# Patient Record
Sex: FEMALE | Race: OTHER
[De-identification: names, ages, dates, MRNs, and addresses within clinical notes are randomized per-mention and may not be internally consistent; named-entity substitution may affect disease eponyms.]

---

## 2023-01-25 PROBLEM — Z00.00 ENCOUNTER FOR PREVENTIVE HEALTH EXAMINATION: Status: ACTIVE | Noted: 2023-01-25

## 2023-02-02 ENCOUNTER — ASOB RESULT (OUTPATIENT)
Age: 18
End: 2023-02-02

## 2023-02-02 ENCOUNTER — APPOINTMENT (OUTPATIENT)
Dept: ANTEPARTUM | Facility: CLINIC | Age: 18
End: 2023-02-02
Payer: MEDICAID

## 2023-02-02 PROCEDURE — 76811 OB US DETAILED SNGL FETUS: CPT

## 2023-02-09 ENCOUNTER — TRANSCRIPTION ENCOUNTER (OUTPATIENT)
Age: 18
End: 2023-02-09

## 2023-03-07 ENCOUNTER — NON-APPOINTMENT (OUTPATIENT)
Age: 18
End: 2023-03-07

## 2023-03-27 ENCOUNTER — ASOB RESULT (OUTPATIENT)
Age: 18
End: 2023-03-27

## 2023-03-27 ENCOUNTER — APPOINTMENT (OUTPATIENT)
Dept: ANTEPARTUM | Facility: CLINIC | Age: 18
End: 2023-03-27
Payer: MEDICAID

## 2023-03-27 ENCOUNTER — APPOINTMENT (OUTPATIENT)
Dept: MATERNAL FETAL MEDICINE | Facility: CLINIC | Age: 18
End: 2023-03-27
Payer: MEDICAID

## 2023-03-27 DIAGNOSIS — O24.419 GESTATIONAL DIABETES MELLITUS IN PREGNANCY, UNSPECIFIED CONTROL: ICD-10-CM

## 2023-03-27 PROCEDURE — 76816 OB US FOLLOW-UP PER FETUS: CPT

## 2023-03-27 PROCEDURE — 76819 FETAL BIOPHYS PROFIL W/O NST: CPT | Mod: 59

## 2023-03-27 PROCEDURE — G0109 DIAB MANAGE TRN IND/GROUP: CPT | Mod: 95

## 2023-04-05 ENCOUNTER — APPOINTMENT (OUTPATIENT)
Dept: MATERNAL FETAL MEDICINE | Facility: CLINIC | Age: 18
End: 2023-04-05
Payer: MEDICAID

## 2023-04-05 ENCOUNTER — ASOB RESULT (OUTPATIENT)
Age: 18
End: 2023-04-05

## 2023-04-05 PROCEDURE — G0108 DIAB MANAGE TRN  PER INDIV: CPT | Mod: 95

## 2023-04-19 ENCOUNTER — APPOINTMENT (OUTPATIENT)
Dept: MATERNAL FETAL MEDICINE | Facility: CLINIC | Age: 18
End: 2023-04-19
Payer: MEDICAID

## 2023-04-19 ENCOUNTER — ASOB RESULT (OUTPATIENT)
Age: 18
End: 2023-04-19

## 2023-04-19 PROCEDURE — G0108 DIAB MANAGE TRN  PER INDIV: CPT | Mod: 95

## 2023-04-26 ENCOUNTER — APPOINTMENT (OUTPATIENT)
Dept: ANTEPARTUM | Facility: CLINIC | Age: 18
End: 2023-04-26
Payer: MEDICAID

## 2023-04-26 ENCOUNTER — ASOB RESULT (OUTPATIENT)
Age: 18
End: 2023-04-26

## 2023-04-26 PROCEDURE — 76816 OB US FOLLOW-UP PER FETUS: CPT

## 2023-04-26 PROCEDURE — 76819 FETAL BIOPHYS PROFIL W/O NST: CPT

## 2023-05-02 ENCOUNTER — APPOINTMENT (OUTPATIENT)
Dept: MATERNAL FETAL MEDICINE | Facility: CLINIC | Age: 18
End: 2023-05-02
Payer: MEDICAID

## 2023-05-02 ENCOUNTER — ASOB RESULT (OUTPATIENT)
Age: 18
End: 2023-05-02

## 2023-05-02 PROCEDURE — G0108 DIAB MANAGE TRN  PER INDIV: CPT | Mod: 95

## 2023-05-16 ENCOUNTER — APPOINTMENT (OUTPATIENT)
Dept: MATERNAL FETAL MEDICINE | Facility: CLINIC | Age: 18
End: 2023-05-16
Payer: MEDICAID

## 2023-05-16 ENCOUNTER — ASOB RESULT (OUTPATIENT)
Age: 18
End: 2023-05-16

## 2023-05-16 PROCEDURE — G0108 DIAB MANAGE TRN  PER INDIV: CPT | Mod: 95

## 2023-05-19 ENCOUNTER — APPOINTMENT (OUTPATIENT)
Dept: ANTEPARTUM | Facility: CLINIC | Age: 18
End: 2023-05-19
Payer: MEDICAID

## 2023-05-19 ENCOUNTER — ASOB RESULT (OUTPATIENT)
Age: 18
End: 2023-05-19

## 2023-05-19 PROCEDURE — 76816 OB US FOLLOW-UP PER FETUS: CPT

## 2023-05-19 PROCEDURE — 76819 FETAL BIOPHYS PROFIL W/O NST: CPT

## 2023-05-31 ENCOUNTER — ASOB RESULT (OUTPATIENT)
Age: 18
End: 2023-05-31

## 2023-05-31 ENCOUNTER — APPOINTMENT (OUTPATIENT)
Dept: MATERNAL FETAL MEDICINE | Facility: CLINIC | Age: 18
End: 2023-05-31
Payer: MEDICAID

## 2023-05-31 PROCEDURE — G0108 DIAB MANAGE TRN  PER INDIV: CPT | Mod: 95

## 2023-06-07 ENCOUNTER — ASOB RESULT (OUTPATIENT)
Age: 18
End: 2023-06-07

## 2023-06-07 ENCOUNTER — APPOINTMENT (OUTPATIENT)
Dept: ANTEPARTUM | Facility: CLINIC | Age: 18
End: 2023-06-07
Payer: MEDICAID

## 2023-06-07 ENCOUNTER — OUTPATIENT (OUTPATIENT)
Dept: INPATIENT UNIT | Facility: HOSPITAL | Age: 18
LOS: 1 days | Discharge: HOME | End: 2023-06-07
Payer: MEDICAID

## 2023-06-07 VITALS — DIASTOLIC BLOOD PRESSURE: 67 MMHG | HEART RATE: 85 BPM | SYSTOLIC BLOOD PRESSURE: 113 MMHG

## 2023-06-07 VITALS
DIASTOLIC BLOOD PRESSURE: 61 MMHG | HEART RATE: 89 BPM | RESPIRATION RATE: 17 BRPM | SYSTOLIC BLOOD PRESSURE: 123 MMHG | TEMPERATURE: 98 F

## 2023-06-07 DIAGNOSIS — O26.899 OTHER SPECIFIED PREGNANCY RELATED CONDITIONS, UNSPECIFIED TRIMESTER: ICD-10-CM

## 2023-06-07 PROCEDURE — 99202 OFFICE O/P NEW SF 15 MIN: CPT | Mod: 25

## 2023-06-07 PROCEDURE — 59025 FETAL NON-STRESS TEST: CPT | Mod: 26

## 2023-06-07 PROCEDURE — 76815 OB US LIMITED FETUS(S): CPT | Mod: 26

## 2023-06-07 PROCEDURE — 76819 FETAL BIOPHYS PROFIL W/O NST: CPT | Mod: 26

## 2023-06-07 NOTE — OB PROVIDER TRIAGE NOTE - NSOBPROVIDERNOTE_OBGYN_ALL_OB_FT
18y/o  at 38.5weeks evaluated for decreased fetal movement.  -NST in progress 16y/o  at 38.5weeks evaluated for decreased fetal movement.  -NST in progress    -NST Complete & reactive.  -BPP .  -pt continues to report positive fm.    awaiting for MD Anderson call back. 16y/o  at 38.5weeks evaluated for decreased fetal movement.  -NST in progress    -NST Complete & reactive.  -BPP .  -pt continues to report positive fm.    awaiting for MD Anderson call back.    1640:  -pt status d/w MD Amaya  -to be d.c home as per MD Amaya.    16y/o  at 38.5weeks with history of decreased fetal movement.  -maternal and fetal status reassuring    - Discussed with Dr. Amaya.  - Patient to be discharged home with follow up and return precautions  - Please follow up with your obstetrician at your next scheduled appointment, tomorrow .  - Fetal kick counts reviewed.  - Please return for decreased/no fetal movement, vaginal bleeding similar to that of a period, leaking/gush of fluid, regular contractions.  - Fluid intake encouraged.  - Patient and partner and educated of plan and demonstrate understanding. All questions answered. Discharge instructions provided and signed.   - Discharged at 1650.

## 2023-06-07 NOTE — OB PROVIDER TRIAGE NOTE - ADDITIONAL INSTRUCTIONS
16y/o  at 38.5weeks with history of decreased fetal movement.  -maternal and fetal status reassuring    - Discussed with Dr. Amaya.  - Patient to be discharged home with follow up and return precautions  - Please follow up with your obstetrician at your next scheduled appointment, tomorrow .  - Fetal kick counts reviewed.  - Please return for decreased/no fetal movement, vaginal bleeding similar to that of a period, leaking/gush of fluid, regular contractions.  - Fluid intake encouraged.  - Patient and partner and educated of plan and demonstrate understanding. All questions answered. Discharge instructions provided and signed.   - Discharged at 1650.

## 2023-06-07 NOTE — OB PROVIDER TRIAGE NOTE - HISTORY OF PRESENT ILLNESS
PNC: WHP    18y/o  at 38.5weeks presents with c/o decreased fm since yesterday. Pt currently reports positive fm since being in triage. Denies vb, lof, and contractions.    AP Course complicated by:  -GDMA2 - 20u NPH HS: most recent use last night   HSV- Valtrex 1000mg BID- most recent use this AM /7. last outbreak @ 36weeks per pt. Denies burning, redness.  -81mg ASA- most recent yesterday  PNC: WHP    16y/o  at 38.5weeks presents with c/o decreased fm since yesterday. Pt currently reports positive fm since being in triage. Denies vb, lof, and contractions.    AP Course complicated by:  -GDMA2 - 20u NPH HS: most recent use last night   HSV- Valtrex 500mg BID- most recent use this AM /7. last outbreak @ 36weeks per pt. Denies burning, redness, lesions, any outbreaks.  -81mg ASA- most recent yesterday  PNC: WHP    18y/o  at 38.5weeks presents with c/o decreased fm since yesterday. Pt currently reports positive fm since being in triage. Denies vb, lof, and contractions.  Pt states she has an induction scheduled for , for GDMA2.    AP Course complicated by:  -GDMA2 - 20u NPH HS: most recent use last night   HSV- Valtrex 500mg BID- most recent use this AM . last outbreak @ 36weeks per pt. Denies burning, redness, lesions, any outbreaks.  -81mg ASA- most recent yesterday

## 2023-06-07 NOTE — OB RN TRIAGE NOTE - FALL HARM RISK - UNIVERSAL INTERVENTIONS
Bed in lowest position, wheels locked, appropriate side rails in place/Call bell, personal items and telephone in reach/Instruct patient to call for assistance before getting out of bed or chair/Non-slip footwear when patient is out of bed/[place] to call system/Physically safe environment - no spills, clutter or unnecessary equipment/Purposeful Proactive Rounding/Room/bathroom lighting operational, light cord in reach

## 2023-06-07 NOTE — OB PROVIDER TRIAGE NOTE - NSHPPHYSICALEXAM_GEN_ALL_CORE
Vital Signs Last 24 Hrs  T(C): 36.8 (07 Jun 2023 15:19), Max: 36.8 (07 Jun 2023 15:19)  T(F): 98.2 (07 Jun 2023 15:19), Max: 98.2 (07 Jun 2023 15:19)  HR: 89 (07 Jun 2023 15:37) (89 - 89)  BP: 123/61 (07 Jun 2023 15:37) (123/61 - 123/61)  BP(mean): --  RR: 17 (07 Jun 2023 15:19) (17 - 17)  SpO2: --    abdomen soft, nontender  HR reg rate, rhythm  lungs clear, equal b/l  NST: 140bpm, moderate variability, + accelerations, no decelerations  toco: no contractions  TAS: Vital Signs Last 24 Hrs  T(C): 36.8 (07 Jun 2023 15:19), Max: 36.8 (07 Jun 2023 15:19)  T(F): 98.2 (07 Jun 2023 15:19), Max: 98.2 (07 Jun 2023 15:19)  HR: 89 (07 Jun 2023 15:37) (89 - 89)  BP: 123/61 (07 Jun 2023 15:37) (123/61 - 123/61)  BP(mean): --  RR: 17 (07 Jun 2023 15:19) (17 - 17)  SpO2: --    abdomen soft, nontender  HR reg rate, rhythm  lungs clear, equal b/l  NST: 140bpm, moderate variability, + accelerations, no decelerations  toco: no contractions  TAS: saved in asob, vertex, anterior placenta, REX 20.1cm, m-mode 144bpm, bpp 8/8

## 2023-06-08 DIAGNOSIS — Z3A.38 38 WEEKS GESTATION OF PREGNANCY: ICD-10-CM

## 2023-06-08 DIAGNOSIS — O36.8130 DECREASED FETAL MOVEMENTS, THIRD TRIMESTER, NOT APPLICABLE OR UNSPECIFIED: ICD-10-CM

## 2023-06-08 DIAGNOSIS — O24.414 GESTATIONAL DIABETES MELLITUS IN PREGNANCY, INSULIN CONTROLLED: ICD-10-CM

## 2023-06-09 ENCOUNTER — APPOINTMENT (OUTPATIENT)
Dept: ANTEPARTUM | Facility: CLINIC | Age: 18
End: 2023-06-09

## 2023-06-09 ENCOUNTER — INPATIENT (INPATIENT)
Facility: HOSPITAL | Age: 18
LOS: 4 days | Discharge: HOME | End: 2023-06-14
Attending: OBSTETRICS & GYNECOLOGY | Admitting: OBSTETRICS & GYNECOLOGY

## 2023-06-09 ENCOUNTER — TRANSCRIPTION ENCOUNTER (OUTPATIENT)
Age: 18
End: 2023-06-09

## 2023-06-09 VITALS — HEART RATE: 103 BPM | SYSTOLIC BLOOD PRESSURE: 114 MMHG | DIASTOLIC BLOOD PRESSURE: 68 MMHG

## 2023-06-09 DIAGNOSIS — O24.419 GESTATIONAL DIABETES MELLITUS IN PREGNANCY, UNSPECIFIED CONTROL: ICD-10-CM

## 2023-06-09 LAB
BASOPHILS # BLD AUTO: 0.02 K/UL — SIGNIFICANT CHANGE UP (ref 0–0.2)
BASOPHILS NFR BLD AUTO: 0.2 % — SIGNIFICANT CHANGE UP (ref 0–2)
EOSINOPHIL # BLD AUTO: 0.08 K/UL — SIGNIFICANT CHANGE UP (ref 0–0.5)
EOSINOPHIL NFR BLD AUTO: 0.8 % — SIGNIFICANT CHANGE UP (ref 0–6)
GLUCOSE BLDC GLUCOMTR-MCNC: 94 MG/DL — SIGNIFICANT CHANGE UP (ref 70–99)
HCT VFR BLD CALC: 37.9 % — SIGNIFICANT CHANGE UP (ref 34.5–45)
HGB BLD-MCNC: 12.4 G/DL — SIGNIFICANT CHANGE UP (ref 11.5–15.5)
IANC: 6.19 K/UL — SIGNIFICANT CHANGE UP (ref 1.8–7.4)
IMM GRANULOCYTES NFR BLD AUTO: 0.7 % — SIGNIFICANT CHANGE UP (ref 0–0.9)
LYMPHOCYTES # BLD AUTO: 2.73 K/UL — SIGNIFICANT CHANGE UP (ref 1–3.3)
LYMPHOCYTES # BLD AUTO: 27.8 % — SIGNIFICANT CHANGE UP (ref 13–44)
MCHC RBC-ENTMCNC: 27.6 PG — SIGNIFICANT CHANGE UP (ref 27–34)
MCHC RBC-ENTMCNC: 32.7 GM/DL — SIGNIFICANT CHANGE UP (ref 32–36)
MCV RBC AUTO: 84.4 FL — SIGNIFICANT CHANGE UP (ref 80–100)
MONOCYTES # BLD AUTO: 0.74 K/UL — SIGNIFICANT CHANGE UP (ref 0–0.9)
MONOCYTES NFR BLD AUTO: 7.5 % — SIGNIFICANT CHANGE UP (ref 2–14)
NEUTROPHILS # BLD AUTO: 6.19 K/UL — SIGNIFICANT CHANGE UP (ref 1.8–7.4)
NEUTROPHILS NFR BLD AUTO: 63 % — SIGNIFICANT CHANGE UP (ref 43–77)
NRBC # BLD: 0 /100 WBCS — SIGNIFICANT CHANGE UP (ref 0–0)
NRBC # FLD: 0 K/UL — SIGNIFICANT CHANGE UP (ref 0–0)
PLATELET # BLD AUTO: 199 K/UL — SIGNIFICANT CHANGE UP (ref 150–400)
RBC # BLD: 4.49 M/UL — SIGNIFICANT CHANGE UP (ref 3.8–5.2)
RBC # FLD: 14.9 % — HIGH (ref 10.3–14.5)
WBC # BLD: 9.83 K/UL — SIGNIFICANT CHANGE UP (ref 3.8–10.5)
WBC # FLD AUTO: 9.83 K/UL — SIGNIFICANT CHANGE UP (ref 3.8–10.5)

## 2023-06-09 RX ORDER — SODIUM CHLORIDE 9 MG/ML
1000 INJECTION, SOLUTION INTRAVENOUS
Refills: 0 | Status: DISCONTINUED | OUTPATIENT
Start: 2023-06-09 | End: 2023-06-10

## 2023-06-09 RX ORDER — CITRIC ACID/SODIUM CITRATE 300-500 MG
15 SOLUTION, ORAL ORAL EVERY 6 HOURS
Refills: 0 | Status: DISCONTINUED | OUTPATIENT
Start: 2023-06-09 | End: 2023-06-10

## 2023-06-09 RX ORDER — CHLORHEXIDINE GLUCONATE 213 G/1000ML
1 SOLUTION TOPICAL DAILY
Refills: 0 | Status: DISCONTINUED | OUTPATIENT
Start: 2023-06-09 | End: 2023-06-10

## 2023-06-09 RX ORDER — OXYTOCIN 10 UNIT/ML
333.33 VIAL (ML) INJECTION
Qty: 20 | Refills: 0 | Status: DISCONTINUED | OUTPATIENT
Start: 2023-06-09 | End: 2023-06-12

## 2023-06-09 RX ORDER — SODIUM CHLORIDE 9 MG/ML
1000 INJECTION INTRAMUSCULAR; INTRAVENOUS; SUBCUTANEOUS
Refills: 0 | Status: DISCONTINUED | OUTPATIENT
Start: 2023-06-09 | End: 2023-06-10

## 2023-06-09 NOTE — OB RN PATIENT PROFILE - FALL HARM RISK - UNIVERSAL INTERVENTIONS
Bed in lowest position, wheels locked, appropriate side rails in place/Call bell, personal items and telephone in reach/Instruct patient to call for assistance before getting out of bed or chair/Non-slip footwear when patient is out of bed/[place] to call system/Physically safe environment - no spills, clutter or unnecessary equipment/Purposeful Proactive Rounding/Room/bathroom lighting operational, light cord in reach

## 2023-06-10 ENCOUNTER — TRANSCRIPTION ENCOUNTER (OUTPATIENT)
Age: 18
End: 2023-06-10

## 2023-06-10 LAB
BLD GP AB SCN SERPL QL: NEGATIVE — SIGNIFICANT CHANGE UP
COVID-19 SPIKE DOMAIN AB INTERP: POSITIVE
COVID-19 SPIKE DOMAIN ANTIBODY RESULT: >250 U/ML — HIGH
GLUCOSE BLDC GLUCOMTR-MCNC: 94 MG/DL — SIGNIFICANT CHANGE UP (ref 70–99)
GLUCOSE BLDC GLUCOMTR-MCNC: 96 MG/DL — SIGNIFICANT CHANGE UP (ref 70–99)
RH IG SCN BLD-IMP: POSITIVE — SIGNIFICANT CHANGE UP
RH IG SCN BLD-IMP: POSITIVE — SIGNIFICANT CHANGE UP
SARS-COV-2 IGG+IGM SERPL QL IA: >250 U/ML — HIGH
SARS-COV-2 IGG+IGM SERPL QL IA: POSITIVE
T PALLIDUM AB TITR SER: NEGATIVE — SIGNIFICANT CHANGE UP

## 2023-06-10 DEVICE — SURGICEL FIBRILLAR 1 X 2": Type: IMPLANTABLE DEVICE | Status: FUNCTIONAL

## 2023-06-10 RX ORDER — IBUPROFEN 200 MG
600 TABLET ORAL EVERY 6 HOURS
Refills: 0 | Status: COMPLETED | OUTPATIENT
Start: 2023-06-10 | End: 2024-05-08

## 2023-06-10 RX ORDER — FAMOTIDINE 10 MG/ML
20 INJECTION INTRAVENOUS ONCE
Refills: 0 | Status: COMPLETED | OUTPATIENT
Start: 2023-06-10 | End: 2023-06-10

## 2023-06-10 RX ORDER — DIPHENHYDRAMINE HCL 50 MG
25 CAPSULE ORAL EVERY 6 HOURS
Refills: 0 | Status: DISCONTINUED | OUTPATIENT
Start: 2023-06-10 | End: 2023-06-14

## 2023-06-10 RX ORDER — CITRIC ACID/SODIUM CITRATE 300-500 MG
30 SOLUTION, ORAL ORAL ONCE
Refills: 0 | Status: COMPLETED | OUTPATIENT
Start: 2023-06-10 | End: 2023-06-10

## 2023-06-10 RX ORDER — OXYCODONE HYDROCHLORIDE 5 MG/1
5 TABLET ORAL ONCE
Refills: 0 | Status: DISCONTINUED | OUTPATIENT
Start: 2023-06-10 | End: 2023-06-14

## 2023-06-10 RX ORDER — KETOROLAC TROMETHAMINE 30 MG/ML
30 SYRINGE (ML) INJECTION EVERY 6 HOURS
Refills: 0 | Status: COMPLETED | OUTPATIENT
Start: 2023-06-10 | End: 2023-06-11

## 2023-06-10 RX ORDER — IBUPROFEN 200 MG
600 TABLET ORAL EVERY 6 HOURS
Refills: 0 | Status: DISCONTINUED | OUTPATIENT
Start: 2023-06-10 | End: 2023-06-14

## 2023-06-10 RX ORDER — HEPARIN SODIUM 5000 [USP'U]/ML
5000 INJECTION INTRAVENOUS; SUBCUTANEOUS EVERY 12 HOURS
Refills: 0 | Status: DISCONTINUED | OUTPATIENT
Start: 2023-06-10 | End: 2023-06-14

## 2023-06-10 RX ORDER — MORPHINE SULFATE 50 MG/1
0.1 CAPSULE, EXTENDED RELEASE ORAL ONCE
Refills: 0 | Status: DISCONTINUED | OUTPATIENT
Start: 2023-06-10 | End: 2023-06-10

## 2023-06-10 RX ORDER — NALOXONE HYDROCHLORIDE 4 MG/.1ML
0.1 SPRAY NASAL
Refills: 0 | Status: DISCONTINUED | OUTPATIENT
Start: 2023-06-10 | End: 2023-06-10

## 2023-06-10 RX ORDER — SIMETHICONE 80 MG/1
80 TABLET, CHEWABLE ORAL EVERY 4 HOURS
Refills: 0 | Status: DISCONTINUED | OUTPATIENT
Start: 2023-06-10 | End: 2023-06-14

## 2023-06-10 RX ORDER — MAGNESIUM HYDROXIDE 400 MG/1
30 TABLET, CHEWABLE ORAL
Refills: 0 | Status: DISCONTINUED | OUTPATIENT
Start: 2023-06-10 | End: 2023-06-14

## 2023-06-10 RX ORDER — ONDANSETRON 8 MG/1
4 TABLET, FILM COATED ORAL EVERY 6 HOURS
Refills: 0 | Status: DISCONTINUED | OUTPATIENT
Start: 2023-06-10 | End: 2023-06-10

## 2023-06-10 RX ORDER — OXYCODONE HYDROCHLORIDE 5 MG/1
5 TABLET ORAL
Refills: 0 | Status: COMPLETED | OUTPATIENT
Start: 2023-06-10 | End: 2023-06-17

## 2023-06-10 RX ORDER — DEXAMETHASONE 0.5 MG/5ML
4 ELIXIR ORAL EVERY 6 HOURS
Refills: 0 | Status: DISCONTINUED | OUTPATIENT
Start: 2023-06-10 | End: 2023-06-10

## 2023-06-10 RX ORDER — TETANUS TOXOID, REDUCED DIPHTHERIA TOXOID AND ACELLULAR PERTUSSIS VACCINE, ADSORBED 5; 2.5; 8; 8; 2.5 [IU]/.5ML; [IU]/.5ML; UG/.5ML; UG/.5ML; UG/.5ML
0.5 SUSPENSION INTRAMUSCULAR ONCE
Refills: 0 | Status: DISCONTINUED | OUTPATIENT
Start: 2023-06-10 | End: 2023-06-14

## 2023-06-10 RX ORDER — LANOLIN
1 OINTMENT (GRAM) TOPICAL EVERY 6 HOURS
Refills: 0 | Status: DISCONTINUED | OUTPATIENT
Start: 2023-06-10 | End: 2023-06-14

## 2023-06-10 RX ORDER — ACETAMINOPHEN 500 MG
975 TABLET ORAL
Refills: 0 | Status: DISCONTINUED | OUTPATIENT
Start: 2023-06-10 | End: 2023-06-14

## 2023-06-10 RX ORDER — SODIUM CHLORIDE 9 MG/ML
1000 INJECTION, SOLUTION INTRAVENOUS
Refills: 0 | Status: DISCONTINUED | OUTPATIENT
Start: 2023-06-10 | End: 2023-06-12

## 2023-06-10 RX ORDER — VALACYCLOVIR 500 MG/1
500 TABLET, FILM COATED ORAL
Refills: 0 | Status: DISCONTINUED | OUTPATIENT
Start: 2023-06-10 | End: 2023-06-14

## 2023-06-10 RX ORDER — OXYTOCIN 10 UNIT/ML
333.33 VIAL (ML) INJECTION
Qty: 20 | Refills: 0 | Status: DISCONTINUED | OUTPATIENT
Start: 2023-06-10 | End: 2023-06-12

## 2023-06-10 RX ADMIN — Medication 30 MILLILITER(S): at 09:33

## 2023-06-10 RX ADMIN — Medication 975 MILLIGRAM(S): at 17:27

## 2023-06-10 RX ADMIN — Medication 600 MILLIGRAM(S): at 21:00

## 2023-06-10 RX ADMIN — Medication 975 MILLIGRAM(S): at 23:10

## 2023-06-10 RX ADMIN — Medication 975 MILLIGRAM(S): at 22:38

## 2023-06-10 RX ADMIN — FAMOTIDINE 20 MILLIGRAM(S): 10 INJECTION INTRAVENOUS at 09:33

## 2023-06-10 RX ADMIN — Medication 975 MILLIGRAM(S): at 16:23

## 2023-06-10 RX ADMIN — VALACYCLOVIR 500 MILLIGRAM(S): 500 TABLET, FILM COATED ORAL at 18:44

## 2023-06-10 RX ADMIN — HEPARIN SODIUM 5000 UNIT(S): 5000 INJECTION INTRAVENOUS; SUBCUTANEOUS at 17:21

## 2023-06-10 RX ADMIN — Medication 600 MILLIGRAM(S): at 20:30

## 2023-06-10 NOTE — OB RN DELIVERY SUMMARY - NSSELHIDDEN_OBGYN_ALL_OB_FT
[NS_DeliveryAttending1_OBGYN_ALL_OB_FT:[id number]],[NS_CirculateRN2_OBGYN_ALL_OB_FT:[id number]]

## 2023-06-10 NOTE — DISCHARGE NOTE OB - CARE PROVIDER_API CALL
Barbara Martin  Obstetrics and Gynecology  [address] [place] [address]  Phone: [phone]  Fax: [phone]  Follow Up Time:

## 2023-06-10 NOTE — DISCHARGE NOTE OB - MEDICATION SUMMARY - MEDICATIONS TO TAKE
I will START or STAY ON the medications listed below when I get home from the hospital:    ibuprofen 600 mg oral tablet  -- 1 tab(s) by mouth every 6 hours as needed for  moderate pain  -- Indication: For Pain     acetaminophen 325 mg oral tablet  -- 3 tab(s) by mouth every 6 hours as needed for  moderate pain  -- Indication: For Pain     ferrous sulfate 325 mg (65 mg elemental iron) oral tablet  -- 1 tab(s) by mouth once a day  -- Indication: For Anemia

## 2023-06-10 NOTE — OB PROVIDER DELIVERY SUMMARY - NSSELHIDDEN_OBGYN_ALL_OB_FT
[NS_DeliveryAttending1_OBGYN_ALL_OB_FT:[id number]],[NS_CirculateRN2_OBGYN_ALL_OB_FT:[id number]] [NS_DeliveryAttending1_OBGYN_ALL_OB_FT:[id number]],[NS_CirculateRN2_OBGYN_ALL_OB_FT:[id number]],[NS_DeliveryAssist1_OBGYN_ALL_OB_FT:[id number]=]

## 2023-06-10 NOTE — OB PROVIDER H&P - NSHPPHYSICALEXAM_GEN_ALL_CORE
VS:Vital Signs Last 24 Hrs  T(C): 36.7 (10 Ed 2023 02:30), Max: 36.7 (10 Ed 2023 02:30)  T(F): 98.06 (10 Ed 2023 02:30), Max: 98.06 (10 Ed 2023 02:30)  HR: 88 (10 Ed 2023 04:22) (88 - 103)  BP: 126/63 (10 Ed 2023 04:22) (114/68 - 126/63)  BP(mean): --  RR: 16 (10 Ed 2023 02:30) (15 - 16)  SpO2: --      GA: well appearing, NAD   Cards: RRR  Pulm: CTAB  EFH: baseline 120, moderate variability, + accels, no decels  [place]: quiet  SSE: three separate lesions, two at introitus appearing to be healed + herpetic lesion on cervix per Dr. Bhandari's exam  TAUS: vtx

## 2023-06-10 NOTE — BRIEF OPERATIVE NOTE - OPERATION/FINDINGS
viable male infant, Apgars 9/9, 3090 grams, VTX presentation. Grossly normal uterus, tubes, ovaries.

## 2023-06-10 NOTE — DISCHARGE NOTE OB - PATIENT PORTAL LINK FT
You can access the FollowMyHealth Patient Portal offered by [facility] by registering at the following website: [email or web address]. By joining [employer]’s FollowMyHealth portal, you will also be able to view your health information using other applications (apps) compatible with our system.

## 2023-06-10 NOTE — CHART NOTE - NSCHARTNOTEFT_GEN_A_CORE
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  patient understands risks and agrees with plan of care, consent obtained and in chart

## 2023-06-10 NOTE — OB PROVIDER DELIVERY SUMMARY - NSPROVIDERDELIVERYNOTE_OBGYN_ALL_OB_FT
PCS for active HSV lesions upon admission and primary HSV outbreak in 3rd trimester   viable male , APGARS 9/9, weight [id number]   normal uterus, tubes and ovaries bilaterally   qbl 345cc PCS for active HSV lesions upon admission and primary HSV outbreak in 3rd trimester   viable male , APGARS 9/9, weight [id number]   normal uterus, tubes and ovaries bilaterally   qbl 673cc PCS for active HSV lesions upon admission and primary HSV outbreak in 3rd trimester   viable male , APGARS 9/9, weight [id number]   normal uterus, tubes and ovaries bilaterally   [facility] 673cc    dictation number: [id number]

## 2023-06-10 NOTE — OB PROVIDER H&P - HISTORY OF PRESENT ILLNESS
18yo  @39w for IOL for GDMA2. HSV+ on SSE 6/10, scheduled for C/S. -LOF, -VB, -ctx. +FM. Denies nausea, vomiting, headaches, SOB, chest pain, palpitations, dysuria, urgency, or frequency.  LMP 22  MELANIE 23  GBS-  EFW 2716 from [facility]  (37w)    PNC:   - HSV+, first outbreak at 35w presented with genital lesions. Tx with Valtrex prophylaxis (last dose yesterday ).  - A2, on 20 units NPH HS    Allergies: Denies  Meds:  - 20 units NPH HS  - Valtrex prophylaxis (started at 35-36 weeks)  - Aspirin 81 mg qd. (last dose )  - Vit D  - PNV  - Fish oil  PMHx: Denies  Social: Denies  FHx: Denies   18yo  @39w for IOL for GDMA2.  -LOF, -VB, -ctx. +FM. Denies nausea, vomiting, headaches, SOB, chest pain, palpitations, dysuria, urgency, or frequency.  LMP 22  MELANIE 23  GBS-  EFW 2716 from sono  (37w)    PNC:   - HSV+, first outbreak at 35w presented with genital lesions. Tx with Valtrex prophylaxis (last dose yesterday ).  - A2, on 20 units NPH HS, reports fasting 90s, postprandial < 140    Allergies: Denies  Meds:  - 20 units NPH HS  - Valtrex prophylaxis (started at 35-36 weeks)  - Aspirin 81 mg qd. (last dose )  - Vit D  - PNV  - Fish oil  PMHx: Denies  Social: Denies  FHx: Denies  All: NKDA

## 2023-06-10 NOTE — OB RN INTRAOPERATIVE NOTE - NSSELHIDDEN_OBGYN_ALL_OB_FT
[NS_DeliveryAttending1_OBGYN_ALL_OB_FT:[id number]],[NS_CirculateRN2_OBGYN_ALL_OB_FT:[id number]] [NS_DeliveryAttending1_OBGYN_ALL_OB_FT:[id number]],[NS_CirculateRN2_OBGYN_ALL_OB_FT:[id number]],[NS_DeliveryAssist1_OBGYN_ALL_OB_FT:[id number]=]

## 2023-06-10 NOTE — OB PROVIDER H&P - NSLOWPPHRISK_OBGYN_A_OB
No previous uterine incision/[place] Pregnancy/Less than or equal to 4 previous vaginal births/No known bleeding disorder/No history of postpartum hemorrhage/No other PPH risks indicated

## 2023-06-10 NOTE — OB RN DELIVERY SUMMARY - NS_SEPSISRSKCALC_OBGYN_ALL_OB_FT
EOS calculated successfully. EOS Risk Factor: 0.5/1000 live births ([facility] national incidence); GA=39w1d; Temp=98.06; ROM=0.017; GBS='Negative'; Antibiotics='No antibiotics or any antibiotics < 2 hrs prior to birth'

## 2023-06-10 NOTE — OB PROVIDER H&P - ASSESSMENT
A/P:   16yo  at 39w presenting for IOL for A2. Found to be HSV+ on SSE  - Plan for C/S  - anesthesia consult          d/w Dr. Eren Bermudez, PGY3  H&P written by Kizzy Crow MS3 A/P:   18yo  at 39w presenting for IOL for A2. On SSE, lesion suspicious for HSV outbreak found. Patient counselled on risk of active infection and that proceeding with  delivery would be safest option. Patient expressed understanding, all questions answered.     -routine labs  -EFM/toco  -NPO, IV hydration  -Pepcid/reglan/bicitra  Fetal: cat 1 tracing, fetal status reassuring  GBS: neg        d/w Dr. Eren Bermudez, PGY3  H&P written by Kizzy Crow MS3

## 2023-06-11 LAB
BASOPHILS # BLD AUTO: 0.02 K/UL — SIGNIFICANT CHANGE UP (ref 0–0.2)
BASOPHILS NFR BLD AUTO: 0.2 % — SIGNIFICANT CHANGE UP (ref 0–2)
EOSINOPHIL # BLD AUTO: 0.08 K/UL — SIGNIFICANT CHANGE UP (ref 0–0.5)
EOSINOPHIL NFR BLD AUTO: 0.7 % — SIGNIFICANT CHANGE UP (ref 0–6)
HCT VFR BLD CALC: 33 % — LOW (ref 34.5–45)
HGB BLD-MCNC: 10.7 G/DL — LOW (ref 11.5–15.5)
HSV1 IGG SER-ACNC: 14.6 INDEX — HIGH
HSV1 IGG SERPL QL IA: POSITIVE
HSV2 IGG FLD-ACNC: 0.1 INDEX — SIGNIFICANT CHANGE UP
HSV2 IGG SERPL QL IA: NEGATIVE — SIGNIFICANT CHANGE UP
IANC: 7.38 K/UL — SIGNIFICANT CHANGE UP (ref 1.8–7.4)
IMM GRANULOCYTES NFR BLD AUTO: 0.5 % — SIGNIFICANT CHANGE UP (ref 0–0.9)
LYMPHOCYTES # BLD AUTO: 26.8 % — SIGNIFICANT CHANGE UP (ref 13–44)
LYMPHOCYTES # BLD AUTO: 3.18 K/UL — SIGNIFICANT CHANGE UP (ref 1–3.3)
MCHC RBC-ENTMCNC: 28.2 PG — SIGNIFICANT CHANGE UP (ref 27–34)
MCHC RBC-ENTMCNC: 32.4 GM/DL — SIGNIFICANT CHANGE UP (ref 32–36)
MCV RBC AUTO: 87.1 FL — SIGNIFICANT CHANGE UP (ref 80–100)
MONOCYTES # BLD AUTO: 1.13 K/UL — HIGH (ref 0–0.9)
MONOCYTES NFR BLD AUTO: 9.5 % — SIGNIFICANT CHANGE UP (ref 2–14)
NEUTROPHILS # BLD AUTO: 7.38 K/UL — SIGNIFICANT CHANGE UP (ref 1.8–7.4)
NEUTROPHILS NFR BLD AUTO: 62.3 % — SIGNIFICANT CHANGE UP (ref 43–77)
NRBC # BLD: 0 /100 WBCS — SIGNIFICANT CHANGE UP (ref 0–0)
NRBC # FLD: 0 K/UL — SIGNIFICANT CHANGE UP (ref 0–0)
PLATELET # BLD AUTO: 177 K/UL — SIGNIFICANT CHANGE UP (ref 150–400)
RBC # BLD: 3.79 M/UL — LOW (ref 3.8–5.2)
RBC # FLD: 14.9 % — HIGH (ref 10.3–14.5)
WBC # BLD: 11.85 K/UL — HIGH (ref 3.8–10.5)
WBC # FLD AUTO: 11.85 K/UL — HIGH (ref 3.8–10.5)

## 2023-06-11 RX ADMIN — Medication 600 MILLIGRAM(S): at 03:28

## 2023-06-11 RX ADMIN — Medication 600 MILLIGRAM(S): at 12:21

## 2023-06-11 RX ADMIN — Medication 975 MILLIGRAM(S): at 06:24

## 2023-06-11 RX ADMIN — VALACYCLOVIR 500 MILLIGRAM(S): 500 TABLET, FILM COATED ORAL at 18:57

## 2023-06-11 RX ADMIN — HEPARIN SODIUM 5000 UNIT(S): 5000 INJECTION INTRAVENOUS; SUBCUTANEOUS at 05:53

## 2023-06-11 RX ADMIN — Medication 600 MILLIGRAM(S): at 19:45

## 2023-06-11 RX ADMIN — Medication 600 MILLIGRAM(S): at 04:00

## 2023-06-11 RX ADMIN — SIMETHICONE 80 MILLIGRAM(S): 80 TABLET, CHEWABLE ORAL at 11:52

## 2023-06-11 RX ADMIN — SIMETHICONE 80 MILLIGRAM(S): 80 TABLET, CHEWABLE ORAL at 21:33

## 2023-06-11 RX ADMIN — Medication 600 MILLIGRAM(S): at 11:51

## 2023-06-11 RX ADMIN — Medication 600 MILLIGRAM(S): at 19:01

## 2023-06-11 RX ADMIN — Medication 975 MILLIGRAM(S): at 22:19

## 2023-06-11 RX ADMIN — SIMETHICONE 80 MILLIGRAM(S): 80 TABLET, CHEWABLE ORAL at 05:54

## 2023-06-11 RX ADMIN — Medication 975 MILLIGRAM(S): at 21:32

## 2023-06-11 RX ADMIN — HEPARIN SODIUM 5000 UNIT(S): 5000 INJECTION INTRAVENOUS; SUBCUTANEOUS at 19:03

## 2023-06-11 RX ADMIN — VALACYCLOVIR 500 MILLIGRAM(S): 500 TABLET, FILM COATED ORAL at 05:54

## 2023-06-11 RX ADMIN — Medication 975 MILLIGRAM(S): at 05:53

## 2023-06-12 RX ORDER — OXYCODONE HYDROCHLORIDE 5 MG/1
5 TABLET ORAL
Refills: 0 | Status: DISCONTINUED | OUTPATIENT
Start: 2023-06-12 | End: 2023-06-14

## 2023-06-12 RX ORDER — FERROUS SULFATE 325(65) MG
325 TABLET ORAL DAILY
Refills: 0 | Status: DISCONTINUED | OUTPATIENT
Start: 2023-06-12 | End: 2023-06-14

## 2023-06-12 RX ORDER — SENNA PLUS 8.6 MG/1
1 TABLET ORAL
Refills: 0 | Status: DISCONTINUED | OUTPATIENT
Start: 2023-06-12 | End: 2023-06-14

## 2023-06-12 RX ADMIN — VALACYCLOVIR 500 MILLIGRAM(S): 500 TABLET, FILM COATED ORAL at 18:28

## 2023-06-12 RX ADMIN — Medication 600 MILLIGRAM(S): at 20:38

## 2023-06-12 RX ADMIN — Medication 975 MILLIGRAM(S): at 14:02

## 2023-06-12 RX ADMIN — Medication 600 MILLIGRAM(S): at 01:54

## 2023-06-12 RX ADMIN — SIMETHICONE 80 MILLIGRAM(S): 80 TABLET, CHEWABLE ORAL at 05:44

## 2023-06-12 RX ADMIN — Medication 600 MILLIGRAM(S): at 02:26

## 2023-06-12 RX ADMIN — Medication 600 MILLIGRAM(S): at 09:50

## 2023-06-12 RX ADMIN — Medication 600 MILLIGRAM(S): at 09:20

## 2023-06-12 RX ADMIN — Medication 975 MILLIGRAM(S): at 05:44

## 2023-06-12 RX ADMIN — HEPARIN SODIUM 5000 UNIT(S): 5000 INJECTION INTRAVENOUS; SUBCUTANEOUS at 18:29

## 2023-06-12 RX ADMIN — OXYCODONE HYDROCHLORIDE 5 MILLIGRAM(S): 5 TABLET ORAL at 02:26

## 2023-06-12 RX ADMIN — HEPARIN SODIUM 5000 UNIT(S): 5000 INJECTION INTRAVENOUS; SUBCUTANEOUS at 05:44

## 2023-06-12 RX ADMIN — Medication 975 MILLIGRAM(S): at 14:32

## 2023-06-12 RX ADMIN — Medication 600 MILLIGRAM(S): at 15:36

## 2023-06-12 RX ADMIN — SENNA PLUS 1 TABLET(S): 8.6 TABLET ORAL at 18:27

## 2023-06-12 RX ADMIN — Medication 975 MILLIGRAM(S): at 22:15

## 2023-06-12 RX ADMIN — Medication 975 MILLIGRAM(S): at 06:15

## 2023-06-12 RX ADMIN — OXYCODONE HYDROCHLORIDE 5 MILLIGRAM(S): 5 TABLET ORAL at 03:00

## 2023-06-12 RX ADMIN — VALACYCLOVIR 500 MILLIGRAM(S): 500 TABLET, FILM COATED ORAL at 05:44

## 2023-06-12 RX ADMIN — Medication 325 MILLIGRAM(S): at 13:57

## 2023-06-13 RX ADMIN — Medication 600 MILLIGRAM(S): at 15:21

## 2023-06-13 RX ADMIN — Medication 975 MILLIGRAM(S): at 12:30

## 2023-06-13 RX ADMIN — Medication 975 MILLIGRAM(S): at 17:19

## 2023-06-13 RX ADMIN — VALACYCLOVIR 500 MILLIGRAM(S): 500 TABLET, FILM COATED ORAL at 17:19

## 2023-06-13 RX ADMIN — Medication 600 MILLIGRAM(S): at 09:35

## 2023-06-13 RX ADMIN — Medication 600 MILLIGRAM(S): at 15:50

## 2023-06-13 RX ADMIN — Medication 975 MILLIGRAM(S): at 11:58

## 2023-06-13 RX ADMIN — HEPARIN SODIUM 5000 UNIT(S): 5000 INJECTION INTRAVENOUS; SUBCUTANEOUS at 06:13

## 2023-06-13 RX ADMIN — HEPARIN SODIUM 5000 UNIT(S): 5000 INJECTION INTRAVENOUS; SUBCUTANEOUS at 17:18

## 2023-06-13 RX ADMIN — Medication 600 MILLIGRAM(S): at 09:05

## 2023-06-13 RX ADMIN — Medication 600 MILLIGRAM(S): at 02:42

## 2023-06-13 RX ADMIN — Medication 600 MILLIGRAM(S): at 21:03

## 2023-06-13 RX ADMIN — VALACYCLOVIR 500 MILLIGRAM(S): 500 TABLET, FILM COATED ORAL at 06:11

## 2023-06-13 RX ADMIN — Medication 975 MILLIGRAM(S): at 05:39

## 2023-06-13 NOTE — PROGRESS NOTE ADULT - NS ATTEND AMEND GEN_ALL_CORE FT
agree with above plan  discharge home in am
Patient evaluated at bedside and found stable. Agree with A/P.

## 2023-06-13 NOTE — PROGRESS NOTE ADULT - SUBJECTIVE AND OBJECTIVE BOX
ANESTHESIA POSTOP CHECK    17y Female POSTOP DAY 1 S/P       NO APPARENT ANESTHESIA COMPLICATIONS    
OB Progress Note:  Delivery, POD#1    S: 16yo POD#1 s/p pLTCS for active HSV lesions. Her pain is well controlled. She is tolerating a regular diet and not yet passing flatus. Denies N/V. Denies CP/SOB/lightheadedness/dizziness. She is ambulating without difficulty. Voiding spontaneously.     O:   Vital Signs Last 24 Hrs  T(C): 36.6 (2023 03:15), Max: 37.1 (10 Ed 2023 16:00)  T(F): 97.9 (2023 03:15), Max: 98.7 (10 Ed 2023 16:00)  HR: 83 (2023 03:15) (62 - 96)  BP: 114/72 (2023 03:15) (96/53 - 118/64)  BP(mean): 71 (10 Ed 2023 13:30) (63 - 102)  RR: 17 (2023 03:15) (9 - 22)  SpO2: 98% (2023 03:15) (97% - 100%)    Parameters below as of 2023 03:15  Patient On (Oxygen Delivery Method): room air    Labs:  Blood type: O Positive  Rubella IgG: RPR: Negative                          12.4   9.83 >-----------< 199    (  @ 23:15 )             37.9    PE:  General: NAD  Abdomen: Mildly distended, appropriately tender, incision c/d/i with dermabond in place  Extremities: No erythema, no pitting edema
Post-Operative Note, C/S  She is a  17y woman who is now post-operative day: 3    Subjective:  The patient feels well.  She is ambulating.   She is tolerating regular diet.  She has passed gas.   She denies nausea and vomiting; denies fever.  She is voiding.  Her pain is controlled; incisional pain is appropriate.  She reports normal postpartum bleeding.  She is breastfeeding.  She is  bonding with baby.   She would like to stay another night in the hospital.     Physical exam:    Vital Signs Last 24 Hrs  T(C): 36.8 (13 Jun 2023 05:50), Max: 36.9 (12 Jun 2023 13:49)  T(F): 98.2 (13 Jun 2023 05:50), Max: 98.5 (12 Jun 2023 13:49)  HR: 95 (13 Jun 2023 05:50) (93 - 103)  BP: 111/73 (13 Jun 2023 05:50) (111/73 - 122/75)  BP(mean): --  RR: 18 (13 Jun 2023 05:50) (17 - 18)  SpO2: 99% (13 Jun 2023 05:50) (99% - 99%)    Parameters below as of 13 Jun 2023 05:50  Patient On (Oxygen Delivery Method): room air        Gen: NAD  Breast: Soft, nontender, not engorged.  Abdomen: Soft, nontender, no distension , firm uterine fundus at umbilicus.  Incision: C/D/I.  Pelvic: Normal lochia noted  Ext: No calf tenderness    LABS:      Rubella status:     Allergies    No Known Allergies    Intolerances      MEDICATIONS  (STANDING):  acetaminophen     Tablet .. 975 milliGRAM(s) Oral <User Schedule>  diphtheria/tetanus/pertussis (acellular) Vaccine (Adacel) 0.5 milliLiter(s) IntraMuscular once  ferrous    sulfate 325 milliGRAM(s) Oral daily  heparin   Injectable 5000 Unit(s) SubCutaneous every 12 hours  ibuprofen  Tablet. 600 milliGRAM(s) Oral every 6 hours  prenatal multivitamin 1 Tablet(s) Oral daily  senna 1 Tablet(s) Oral two times a day  valACYclovir 500 milliGRAM(s) Oral two times a day    MEDICATIONS  (PRN):  diphenhydrAMINE 25 milliGRAM(s) Oral every 6 hours PRN Pruritus  lanolin Ointment 1 Application(s) Topical every 6 hours PRN Sore Nipples  magnesium hydroxide Suspension 30 milliLiter(s) Oral two times a day PRN Constipation  oxyCODONE    IR 5 milliGRAM(s) Oral every 3 hours PRN Moderate to Severe Pain (4-10)  oxyCODONE    IR 5 milliGRAM(s) Oral once PRN Moderate to Severe Pain (4-10)  simethicone 80 milliGRAM(s) Chew every 4 hours PRN Gas        Assessment and Plan  POD # 3 s/p  p/c/s on 6/10/2023 c/b Primary HSVII outbreak.    Doing well.  Encourage ambulation.  Incisional care and PO instructions reviewed.  breastfeeding encouraged.  PP education materials provided.   Continue valtrex bid  Plan for D/c tomorrow 6/14  Plan reviewed with Dr. Carbone

## 2023-06-14 VITALS
SYSTOLIC BLOOD PRESSURE: 124 MMHG | HEART RATE: 90 BPM | TEMPERATURE: 98 F | DIASTOLIC BLOOD PRESSURE: 80 MMHG | RESPIRATION RATE: 17 BRPM | OXYGEN SATURATION: 99 %

## 2023-06-14 RX ORDER — VALACYCLOVIR 500 MG/1
1 TABLET, FILM COATED ORAL
Refills: 0 | DISCHARGE

## 2023-06-14 RX ORDER — FERROUS SULFATE 325(65) MG
1 TABLET ORAL
Qty: 0 | Refills: 0 | DISCHARGE
Start: 2023-06-14

## 2023-06-14 RX ORDER — IBUPROFEN 200 MG
1 TABLET ORAL
Qty: 0 | Refills: 0 | DISCHARGE
Start: 2023-06-14

## 2023-06-14 RX ORDER — ACETAMINOPHEN 500 MG
3 TABLET ORAL
Qty: 0 | Refills: 0 | DISCHARGE
Start: 2023-06-14

## 2023-06-14 RX ADMIN — Medication 975 MILLIGRAM(S): at 06:06

## 2023-06-14 RX ADMIN — Medication 600 MILLIGRAM(S): at 04:55

## 2023-06-14 RX ADMIN — VALACYCLOVIR 500 MILLIGRAM(S): 500 TABLET, FILM COATED ORAL at 06:06

## 2023-06-14 RX ADMIN — Medication 600 MILLIGRAM(S): at 11:52

## 2023-06-14 RX ADMIN — HEPARIN SODIUM 5000 UNIT(S): 5000 INJECTION INTRAVENOUS; SUBCUTANEOUS at 06:09

## 2023-06-14 RX ADMIN — Medication 975 MILLIGRAM(S): at 00:01

## 2023-06-14 RX ADMIN — Medication 600 MILLIGRAM(S): at 10:52

## 2023-06-14 NOTE — PROGRESS NOTE ADULT - ASSESSMENT
Patient seen at bedside resting comfortably offers no new complaints. + Ambulation, + void without difficulty, + flatus;  no bm;  tolerating regular diet. both breastfeeding and bottle feeding. Denies HA, blurry vision or epigastric pain, CP, SOB, N/V/D,  dizziness, palpitations, worsening vaginal bleeding.     Vital Signs Last 24 Hrs  T(C): 36.7 (14 Jun 2023 06:07), Max: 36.8 (13 Jun 2023 14:00)  T(F): 98 (14 Jun 2023 06:07), Max: 98.3 (13 Jun 2023 14:00)  HR: 84 (14 Jun 2023 06:07) (81 - 88)  BP: 108/71 (14 Jun 2023 06:07) (108/71 - 122/73)  BP(mean): --  RR: 19 (14 Jun 2023 06:07) (18 - 19)  SpO2: 99% (14 Jun 2023 06:07) (99% - 100%)    Parameters below as of 14 Jun 2023 06:07  Patient On (Oxygen Delivery Method): room air        Gen: A&O x 3, NAD  Chest: CTA B/L  Cardiac: S1,S2  RRR  Breast: Soft, nontender, nonengorged  Abdomen: +BS; soft; Nontender, nondistended, Incision C/D/I steri strips in place   Gyn: Minimal lochia  Extremities: Nontender, DTRS 2+, no worsening edema        A/P: 17 yr old F POD #4 s/p p/c/s for Primary HSV II outbreak     - Meeting PP milestones   - Incision- C/D/I  - Incision care discussed   - Motrin/Tylenol PRN  - Valtrex BID until 6/17  - Discharge Home  - Discharge Resources provided   - Pt to follow up in [facility] office in 2 weeks for incision check      -d/w dr Elisha Allison NP 
Patient seen at bedside resting comfortably offers no new complaints. + Ambulation, + void without difficulty, + flatus;  no bm; tolerating regular diet. both breastfeeding and bottle feeding. Denies HA, blurry vision or epigastric pain, CP, SOB, N/V/D,  dizziness, palpitations, worsening vaginal bleeding.    Vital Signs Last 24 Hrs  T(C): 36.8 (12 Jun 2023 05:45), Max: 36.8 (12 Jun 2023 05:45)  T(F): 98.2 (12 Jun 2023 05:45), Max: 98.2 (12 Jun 2023 05:45)  HR: 98 (12 Jun 2023 05:50) (94 - 104)  BP: 114/78 (12 Jun 2023 05:45) (114/78 - 130/73)  BP(mean): --  RR: 17 (12 Jun 2023 05:45) (17 - 18)  SpO2: 100% (12 Jun 2023 05:45) (98% - 100%)    Parameters below as of 12 Jun 2023 05:45  Patient On (Oxygen Delivery Method): room air        Gen: A&O x 3, NAD  Chest: CTABL  Cardiac: S1, S2, RRR  Breast: Soft, nontender, nonengorged  Abdomen: +BS; soft; Nontender, nondistended, Incision C/D/I steri strips in place   Gyn: Minimal lochia  Extremities: Nontender, DTRS 2+, no worsening edema                          10.7   11.85 )-----------( 177      ( 11 Jun 2023 05:45 )             33.0       A/P: 17 yr old F POD #2 s/p p/c/s on 6/10/2023 c/b Primary HSVII outbreak.      - Meeting PP milestones   - Pain management as needed  - cont post op care  - Dressing C/D/I   - Cont on Valtrex BID for HSV II    - Discharge planning   -d/w dr Myriam Nieves NP 
A/P: 16yo POD#1 s/p pLTCS for active HSV lesions. Patient is stable and doing well post-operatively.    - Continue regular diet.  - Increase ambulation.  - HSQ, venodynes for DVT prophylaxis  - Continue motrin, tylenol, oxycodone PRN for pain control  - F/u AM CBC  - Valtrex 500mg BID for active HSV    Balta Hidalgo, PGY1

## 2023-10-23 ENCOUNTER — EMERGENCY (EMERGENCY)
Facility: HOSPITAL | Age: 18
LOS: 0 days | Discharge: HOME | End: 2023-10-23
Attending: STUDENT IN AN ORGANIZED HEALTH CARE EDUCATION/TRAINING PROGRAM
Payer: MEDICAID

## 2023-10-23 VITALS
RESPIRATION RATE: 14 BRPM | TEMPERATURE: 98 F | SYSTOLIC BLOOD PRESSURE: 138 MMHG | HEART RATE: 94 BPM | WEIGHT: 169.98 LBS | DIASTOLIC BLOOD PRESSURE: 80 MMHG | OXYGEN SATURATION: 100 %

## 2023-10-23 VITALS
TEMPERATURE: 98 F | DIASTOLIC BLOOD PRESSURE: 74 MMHG | OXYGEN SATURATION: 98 % | RESPIRATION RATE: 16 BRPM | HEART RATE: 86 BPM | SYSTOLIC BLOOD PRESSURE: 108 MMHG
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
## 2023-10-23 DIAGNOSIS — R51.9 HEADACHE, UNSPECIFIED: ICD-10-CM

## 2023-10-23 DIAGNOSIS — Z88.6 ALLERGY STATUS TO ANALGESIC AGENT: ICD-10-CM

## 2023-10-23 DIAGNOSIS — R04.0 EPISTAXIS: ICD-10-CM
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
## 2023-10-23 DIAGNOSIS — S02.2XXA FRACTURE OF NASAL BONES, INITIAL ENCOUNTER FOR CLOSED FRACTURE: ICD-10-CM

## 2023-10-23 PROBLEM — B00.9 HERPESVIRAL INFECTION, UNSPECIFIED: Chronic | Status: ACTIVE | Noted: 2023-06-10

## 2023-10-23 LAB
HCG UR QL: NEGATIVE — SIGNIFICANT CHANGE UP
HCG UR QL: NEGATIVE — SIGNIFICANT CHANGE UP

## 2023-10-23 PROCEDURE — 70450 CT HEAD/BRAIN W/O DYE: CPT | Mod: 26,MG

## 2023-10-23 PROCEDURE — G1004: CPT

## 2023-10-23 PROCEDURE — 99284 EMERGENCY DEPT VISIT MOD MDM: CPT | Mod: 25

## 2023-10-23 PROCEDURE — 70486 CT MAXILLOFACIAL W/O DYE: CPT | Mod: 26,MG

## 2023-10-23 RX ORDER — ACETAMINOPHEN 500 MG
1 TABLET ORAL
Qty: 21 | Refills: 0
Start: 2023-10-23 | End: 2023-10-29

## 2023-10-23 RX ORDER — OXYMETAZOLINE HYDROCHLORIDE 0.5 MG/ML
2 SPRAY NASAL ONCE
Refills: 0 | Status: COMPLETED | OUTPATIENT
Start: 2023-10-23 | End: 2023-10-23

## 2023-10-23 RX ORDER — ACETAMINOPHEN 500 MG
650 TABLET ORAL ONCE
Refills: 0 | Status: COMPLETED | OUTPATIENT
Start: 2023-10-23 | End: 2023-10-23

## 2023-10-23 RX ADMIN — Medication 650 MILLIGRAM(S): at 08:36

## 2023-10-23 RX ADMIN — Medication 650 MILLIGRAM(S): at 09:30

## 2023-10-23 RX ADMIN — OXYMETAZOLINE HYDROCHLORIDE 2 SPRAY(S): 0.5 SPRAY NASAL at 10:51

## 2023-10-23 NOTE — ED PROVIDER NOTE - CLINICAL SUMMARY MEDICAL DECISION MAKING FREE TEXT BOX
16 y/o F no pmhx presents for facial pain s/p fall. endorsing trip and hit her face against the tub. denies loc. denies blood thinner use. endorsing bleeding from the nose that has slowed down and it is "Dripping down back of throat."  edema over nasal bridge, no nasal septal hematoma. blood in bilateral nares, not actively bleeding in ER. steady gait. EOMI. will get CT head/maxillofacial - r/o fracture/bleed. possible concussion. moving all extremities w/ steady gait.

## 2023-10-23 NOTE — ED PROVIDER NOTE - PROGRESS NOTE DETAILS
results discussed w/ patient, well appearing. no significant active bleeding. will give afrin spray as patient is declining to hold pressure due to pain. pain is improved s/p tylenol. -Iliana

## 2023-10-23 NOTE — ED PROVIDER NOTE - NSFOLLOWUPINSTRUCTIONS_ED_ALL_ED_FT
Followup with ENT doctor in the next 1-10 days.  Avoid blowing nose.   Return to emergency department if bleeding worsens, worsening pain, difficulty seeing, vision changes, fever/chills.     Fracture    A fracture is a break in one of your bones. This can occur from a variety of injuries, especially traumatic ones. Symptoms include pain, bruising, or swelling. Do not use the injured limb. If a fracture is in one of the bones below your waist, do not put weight on that limb unless instructed to do so by your healthcare provider. Crutches or a cane may have been provided. A splint or cast may have been applied by your health care provider. Make sure to keep it dry and follow up with an orthopedist as instructed.    SEEK IMMEDIATE MEDICAL CARE IF YOU HAVE ANY OF THE FOLLOWING SYMPTOMS: numbness, tingling, increasing pain, or weakness in any part of the injured limb.

## 2023-10-23 NOTE — ED PROVIDER NOTE - CARE PROVIDER_API CALL
Champ Chery  Otolaryngology  [address], [place] [address]  Phone: [phone]  Fax: [phone]  Follow Up Time: 7-10 Days

## 2023-10-23 NOTE — ED PROVIDER NOTE - OBJECTIVE STATEMENT
18 y/o F no pmhx presents for facial pain s/p fall. endorsing trip and hit her face against the tub. denies loc. denies blood thinner use. endorsing bleeding from the nose that has slowed down and it is "Dripping down back of throat." endorsing headache. denies nausea/vomiting. denies fever/chills. denies chest pain/sob. denies abdominal pain.

## 2023-10-23 NOTE — ED ADULT NURSE NOTE - NSFALLUNIVINTERV_ED_ALL_ED
Bed/Stretcher in lowest position, wheels locked, appropriate side rails in place/Call bell, personal items and telephone in reach/Instruct patient to call for assistance before getting out of bed/chair/stretcher/Non-slip footwear applied when patient is off stretcher/[place] to call system/Physically safe environment - no spills, clutter or unnecessary equipment/Purposeful proactive rounding/Room/bathroom lighting operational, light cord in reach

## 2023-10-23 NOTE — ED PROVIDER NOTE - PHYSICAL EXAMINATION
General: Well appearing female in no acute distress  HEENT: Normocephalic, atraumatic. Moist mucous membranes. Oropharynx clear. No lymphadenopathy. +blood in bilateral nares, no nasal septal hematoma.   Eyes: No scleral icterus. EOMI. ADELINE.  Neck:. Soft and supple. Full ROM without pain. No midline tenderness  Cardiac: Regular rate and regular rhythm. No murmurs, rubs, gallops. Peripheral pulses 2+ and symmetric. No LE edema.  Resp: Lungs CTAB. Speaking in full sentences. No wheezes, rales or rhonchi.  Abd: Soft, non-tender, non-distended. No guarding or rebound. No scars, masses, or lesions.  Back: Spine midline and non-tender. No CVA tenderness.    Skin: No rashes, abrasions, or lacerations.  Neuro: AO x 3. Moves all extremities symmetrically. Motor strength and sensation grossly intact. steady gait

## 2023-10-23 NOTE — ED ADULT TRIAGE NOTE - CHIEF COMPLAINT QUOTE
Came in for trip and fall, hit face on the bathtub. Arrived nose bleeding, complains of pain on nose bridge area, unable to pinch. Denies PMH. Denies taking blood thinners.

## 2023-10-23 NOTE — ED PROVIDER NOTE - PATIENT PORTAL LINK FT
You can access the FollowMyHealth Patient Portal offered by [facility] by registering at the following website: [email or web address]. By joining [employer]’s FollowMyHealth portal, you will also be able to view your health information using other applications (apps) compatible with our system.

## 2023-10-23 NOTE — ED ADULT NURSE NOTE - OBJECTIVE STATEMENT
A&ox4. Patient C/O A&ox4. Patient C/O S/P mechanically fall patient landed face forward. no LOC / denies blood thinner use. or PMH  patient states 7/10 upper jaw pain and has b/l epistaxis controlled. denies HA/ Dizziness

## 2024-03-07 NOTE — OB PROVIDER TRIAGE NOTE - CURRENT PREGNANCY COMPLICATIONS, OB PROFILE
starchy vegetables, and whole fruits (higher fiber versions) whenever possible.   Has not purchase. -put glucose tablets in car for back up. Discuss freestyle gali with insurance this month (to possibly reduce number of times having to check blood sugars. Noted finger fatigue)       Nutrition Prescription and  Intervention Self monitoring with blood sugar log  Exercise goals. Reviewed srength training. Step tracking on sneha. >10k per day.  Goal setting  Provided support for changes made.   Created pack list with pt to keep in car and plan to prep meals ahead of time at home.      Patient Education:  [x]  Review current plan with patient   []  Other:    Handouts/  Information Provided: []  Carbohydrates  []  Protein  []  Fiber  []  Serving Sizes  []  Fluids  []  General guidelines []  Diabetes  []  Cholesterol  []  Sodium  []  SBGM  []  Food Journals  []  Others:      Patient Goals -pack back up snacks (list provided) in car.   -when cooking, make double portions to freeze 3-4 servings for busy days.     -continue 10k steps daily + gym 4 days per week.  -continue strength training 3-4 times per week   -continue to measure out portions consistently for awareness  -continue to include complex carbohydrates at each meal. Minimum 15g. Recommended 30-40g per meal (15-30g if not counting vegetables).   -continue increase vegetable intake (non-starchy) to 2 cups per day cooked  -continue increase fiber intake from carbohydrate sources by choosing whole grains, beans, starchy vegetables, and whole fruits (higher fiber versions) whenever possible.   -put glucose tablets in car for back up. Discuss freestyle gali with insurance this month (to possibly reduce number of times having to check blood sugars. Noted finger fatigue)      PLAN  [x]  Continue on current plan []  Follow-up PRN   []  Discharge due to :    [x]  Next appt: 4 weeks     Dietitian: Lizz Lockhart MS, RD, CSSD    Date: 3/7/2024 Time: 10:34 AM     
Gestational Diabetes

## 2024-03-18 ENCOUNTER — LABORATORY RESULT (OUTPATIENT)
Age: 19
End: 2024-03-18

## 2024-03-19 ENCOUNTER — APPOINTMENT (OUTPATIENT)
Dept: ANTEPARTUM | Facility: CLINIC | Age: 19
End: 2024-03-19
Payer: MEDICAID

## 2024-03-19 ENCOUNTER — ASOB RESULT (OUTPATIENT)
Age: 19
End: 2024-03-19

## 2024-03-19 PROCEDURE — 76801 OB US < 14 WKS SINGLE FETUS: CPT | Mod: 59

## 2024-03-19 PROCEDURE — 76813 OB US NUCHAL MEAS 1 GEST: CPT

## 2024-05-14 ENCOUNTER — ASOB RESULT (OUTPATIENT)
Age: 19
End: 2024-05-14

## 2024-05-14 ENCOUNTER — APPOINTMENT (OUTPATIENT)
Dept: ANTEPARTUM | Facility: CLINIC | Age: 19
End: 2024-05-14
Payer: MEDICAID

## 2024-05-14 PROCEDURE — 76811 OB US DETAILED SNGL FETUS: CPT

## 2024-06-28 ENCOUNTER — APPOINTMENT (OUTPATIENT)
Dept: OBGYN | Facility: CLINIC | Age: 19
End: 2024-06-28

## 2024-06-28 VITALS
HEIGHT: 62 IN | HEART RATE: 118 BPM | WEIGHT: 180 LBS | DIASTOLIC BLOOD PRESSURE: 76 MMHG | BODY MASS INDEX: 33.13 KG/M2 | SYSTOLIC BLOOD PRESSURE: 109 MMHG

## 2024-06-28 DIAGNOSIS — Z34.90 ENCOUNTER FOR SUPERVISION OF NORMAL PREGNANCY, UNSPECIFIED, UNSPECIFIED TRIMESTER: ICD-10-CM

## 2024-06-28 LAB
BASOPHILS # BLD AUTO: 0.07 K/UL
BASOPHILS NFR BLD AUTO: 0.6 %
EOSINOPHIL # BLD AUTO: 0.19 K/UL
EOSINOPHIL NFR BLD AUTO: 1.7 %
HCT VFR BLD CALC: 39.1 %
HGB BLD-MCNC: 12.6 G/DL
HIV1+2 AB SPEC QL IA.RAPID: NONREACTIVE
IMM GRANULOCYTES NFR BLD AUTO: 2.6 %
LYMPHOCYTES # BLD AUTO: 2.8 K/UL
LYMPHOCYTES NFR BLD AUTO: 25.5 %
MAN DIFF?: NORMAL
MCHC RBC-ENTMCNC: 28.4 PG
MCHC RBC-ENTMCNC: 32.2 GM/DL
MCV RBC AUTO: 88.3 FL
MONOCYTES # BLD AUTO: 0.64 K/UL
MONOCYTES NFR BLD AUTO: 5.8 %
NEUTROPHILS # BLD AUTO: 6.97 K/UL
NEUTROPHILS NFR BLD AUTO: 63.8 %
PLATELET # BLD AUTO: 216 K/UL
RBC # BLD: 4.43 M/UL
RBC # FLD: 13.5 %
WBC # FLD AUTO: 10.96 K/UL

## 2024-06-28 PROCEDURE — 99203 OFFICE O/P NEW LOW 30 MIN: CPT | Mod: TH

## 2024-06-29 LAB
B19V IGG SER QL IA: 0.13 INDEX
B19V IGG+IGM SER-IMP: NEGATIVE
B19V IGG+IGM SER-IMP: NORMAL
B19V IGM FLD-ACNC: 0.23 INDEX
B19V IGM SER-ACNC: NEGATIVE
CMV IGG SERPL QL: 8.3 U/ML
CMV IGG SERPL-IMP: POSITIVE
CMV IGM SERPL QL: <8 AU/ML
CMV IGM SERPL QL: NEGATIVE
T GONDII AB SER-IMP: NEGATIVE
T GONDII AB SER-IMP: NEGATIVE
T GONDII IGG SER QL: <3 IU/ML
T GONDII IGM SER QL: <3 AU/ML
T PALLIDUM AB SER QL IA: NEGATIVE

## 2024-07-11 ENCOUNTER — APPOINTMENT (OUTPATIENT)
Dept: OBGYN | Facility: CLINIC | Age: 19
End: 2024-07-11
Payer: MEDICAID

## 2024-07-11 ENCOUNTER — NON-APPOINTMENT (OUTPATIENT)
Age: 19
End: 2024-07-11

## 2024-07-11 VITALS
HEIGHT: 62 IN | HEART RATE: 120 BPM | DIASTOLIC BLOOD PRESSURE: 69 MMHG | WEIGHT: 182 LBS | BODY MASS INDEX: 33.49 KG/M2 | SYSTOLIC BLOOD PRESSURE: 123 MMHG

## 2024-07-11 DIAGNOSIS — Z34.90 ENCOUNTER FOR SUPERVISION OF NORMAL PREGNANCY, UNSPECIFIED, UNSPECIFIED TRIMESTER: ICD-10-CM

## 2024-07-11 PROCEDURE — 99203 OFFICE O/P NEW LOW 30 MIN: CPT | Mod: TH

## 2024-07-25 ENCOUNTER — ASOB RESULT (OUTPATIENT)
Age: 19
End: 2024-07-25

## 2024-07-25 ENCOUNTER — APPOINTMENT (OUTPATIENT)
Dept: ANTEPARTUM | Facility: CLINIC | Age: 19
End: 2024-07-25
Payer: MEDICAID

## 2024-07-25 PROCEDURE — 76816 OB US FOLLOW-UP PER FETUS: CPT

## 2024-07-25 PROCEDURE — 76819 FETAL BIOPHYS PROFIL W/O NST: CPT | Mod: 59

## 2024-07-26 ENCOUNTER — APPOINTMENT (OUTPATIENT)
Dept: OBGYN | Facility: CLINIC | Age: 19
End: 2024-07-26

## 2024-08-05 ENCOUNTER — NON-APPOINTMENT (OUTPATIENT)
Age: 19
End: 2024-08-05

## 2024-08-05 ENCOUNTER — APPOINTMENT (OUTPATIENT)
Dept: OBGYN | Facility: CLINIC | Age: 19
End: 2024-08-05

## 2024-08-05 PROBLEM — Z98.891 H/O CESAREAN SECTION: Status: ACTIVE | Noted: 2024-08-05

## 2024-08-05 PROBLEM — M54.9 BACK PAIN WITH SCIATICA: Status: ACTIVE | Noted: 2024-08-05

## 2024-08-05 PROCEDURE — 99213 OFFICE O/P EST LOW 20 MIN: CPT | Mod: TH

## 2024-08-19 ENCOUNTER — APPOINTMENT (OUTPATIENT)
Dept: OBGYN | Facility: CLINIC | Age: 19
End: 2024-08-19
Payer: MEDICAID

## 2024-08-19 VITALS
WEIGHT: 186 LBS | HEIGHT: 62 IN | SYSTOLIC BLOOD PRESSURE: 110 MMHG | HEART RATE: 109 BPM | DIASTOLIC BLOOD PRESSURE: 76 MMHG | BODY MASS INDEX: 34.23 KG/M2

## 2024-08-19 DIAGNOSIS — A60.00 HERPESVIRAL INFECTION OF UROGENITAL SYSTEM, UNSPECIFIED: ICD-10-CM

## 2024-08-19 PROCEDURE — 99213 OFFICE O/P EST LOW 20 MIN: CPT | Mod: TH,25

## 2024-08-19 RX ORDER — VALACYCLOVIR 500 MG/1
500 TABLET, FILM COATED ORAL
Qty: 60 | Refills: 1 | Status: ACTIVE | COMMUNITY
Start: 2024-08-19 | End: 1900-01-01

## 2024-08-26 ENCOUNTER — APPOINTMENT (OUTPATIENT)
Dept: OBGYN | Facility: CLINIC | Age: 19
End: 2024-08-26
Payer: MEDICAID

## 2024-08-26 VITALS
HEART RATE: 108 BPM | BODY MASS INDEX: 34.04 KG/M2 | WEIGHT: 185 LBS | DIASTOLIC BLOOD PRESSURE: 68 MMHG | SYSTOLIC BLOOD PRESSURE: 101 MMHG | HEIGHT: 62 IN

## 2024-08-26 DIAGNOSIS — O24.419 GESTATIONAL DIABETES MELLITUS IN PREGNANCY, UNSPECIFIED CONTROL: ICD-10-CM

## 2024-08-26 PROCEDURE — 99213 OFFICE O/P EST LOW 20 MIN: CPT | Mod: TH

## 2024-08-29 ENCOUNTER — TRANSCRIPTION ENCOUNTER (OUTPATIENT)
Age: 19
End: 2024-08-29

## 2024-08-29 LAB
GP B STREP DNA SPEC QL NAA+PROBE: NOT DETECTED
SOURCE GBS: NORMAL

## 2024-08-30 ENCOUNTER — NON-APPOINTMENT (OUTPATIENT)
Age: 19
End: 2024-08-30

## 2024-09-06 ENCOUNTER — OUTPATIENT (OUTPATIENT)
Dept: OUTPATIENT SERVICES | Facility: HOSPITAL | Age: 19
LOS: 1 days | End: 2024-09-06

## 2024-09-06 VITALS
RESPIRATION RATE: 16 BRPM | OXYGEN SATURATION: 98 % | DIASTOLIC BLOOD PRESSURE: 77 MMHG | SYSTOLIC BLOOD PRESSURE: 112 MMHG | HEART RATE: 84 BPM | TEMPERATURE: 98 F

## 2024-09-06 DIAGNOSIS — Z98.891 HISTORY OF UTERINE SCAR FROM PREVIOUS SURGERY: Chronic | ICD-10-CM

## 2024-09-06 DIAGNOSIS — Z34.90 ENCOUNTER FOR SUPERVISION OF NORMAL PREGNANCY, UNSPECIFIED, UNSPECIFIED TRIMESTER: ICD-10-CM

## 2024-09-06 DIAGNOSIS — Z98.891 HISTORY OF UTERINE SCAR FROM PREVIOUS SURGERY: ICD-10-CM

## 2024-09-06 LAB
APPEARANCE UR: ABNORMAL
BACTERIA # UR AUTO: ABNORMAL /HPF
BILIRUB UR-MCNC: NEGATIVE — SIGNIFICANT CHANGE UP
BLD GP AB SCN SERPL QL: NEGATIVE — SIGNIFICANT CHANGE UP
CAST: 0 /LPF — SIGNIFICANT CHANGE UP (ref 0–4)
COD CRY URNS QL: PRESENT
COLOR SPEC: YELLOW — SIGNIFICANT CHANGE UP
DIFF PNL FLD: NEGATIVE — SIGNIFICANT CHANGE UP
GLUCOSE UR QL: NEGATIVE MG/DL — SIGNIFICANT CHANGE UP
KETONES UR-MCNC: NEGATIVE MG/DL — SIGNIFICANT CHANGE UP
LEUKOCYTE ESTERASE UR-ACNC: ABNORMAL
NITRITE UR-MCNC: NEGATIVE — SIGNIFICANT CHANGE UP
PH UR: 7 — SIGNIFICANT CHANGE UP (ref 5–8)
PROT UR-MCNC: NEGATIVE MG/DL — SIGNIFICANT CHANGE UP
RBC CASTS # UR COMP ASSIST: 1 /HPF — SIGNIFICANT CHANGE UP (ref 0–4)
REVIEW: SIGNIFICANT CHANGE UP
RH IG SCN BLD-IMP: POSITIVE — SIGNIFICANT CHANGE UP
SP GR SPEC: 1.02 — SIGNIFICANT CHANGE UP (ref 1–1.03)
SQUAMOUS # UR AUTO: 27 /HPF — HIGH (ref 0–5)
UROBILINOGEN FLD QL: 1 MG/DL — SIGNIFICANT CHANGE UP (ref 0.2–1)
WBC UR QL: 1 /HPF — SIGNIFICANT CHANGE UP (ref 0–5)

## 2024-09-06 RX ORDER — CHLORHEXIDINE GLUCONATE 40 MG/ML
1 SOLUTION TOPICAL DAILY
Refills: 0 | Status: DISCONTINUED | OUTPATIENT
Start: 2024-09-14 | End: 2024-09-14

## 2024-09-06 NOTE — OB PST NOTE - HISTORY OF PRESENT ILLNESS
Pt is a 18 yr old female scheduled for  with Dr Gerardo 24 - pt Gestation 38 weeks ,  with   at 39 weeks for outbreak of Herpes -pt now denies HTN, GDM or thyroid disorder - pt denies vaginal bleeding or contractions  Pt is a 18 yr old female scheduled for  with Dr Gerardo 24 - pt Gestation 38 weeks ,  with   at 39 weeks for outbreak of Herpes -pt now denies HTN, GDM or thyroid disorder - pt denies vaginal bleeding or contractions - pt seen by OB last week and fetal heartbeat and activity monitored - pt confirms fetal movement today

## 2024-09-06 NOTE — OB PST NOTE - NSHPREVIEWOFSYSTEMS_GEN_ALL_CORE
General: No fever, chills, sweating, anorexia, weight loss or weight gain. No polyphagia, polyurea, polydypsia, malaise, or fatigue    Skin: No rashes, itching, or dryness. No change in size/color of moles. No tumors, brittle nails, pitted nails, or hair loss    Breast: No tenderness, lumps, or nipple discharge      Ophthalmologic: No diplopia, photophobia, lacrimation, blurred Vision , or eye discharge    ENMT Symptoms: No hearing difficulty, ear pain, tinnitus, or vertigo. No sinus symptoms, nasal congestion, nasal   discharge, or nasal obstruction    Respiratory and Thorax: No wheezing, dyspnea, cough, hemoptysis, or pleuritic chest pPain     Cardiovascular: No chest pain, palpitations, dyspnea on exertion, orthopnea, paroxysmal nocturnal dyspnea,   peripheral edema, or claudication    Gastrointestinal: No nausea, vomiting, diarrhea, constipation, change in bowel habits, flatulence, abdominal pain, or melena    Genitourinary/ Pelvis: No hematuria, renal colic, or flank pain.  No urine discoloration, incontinence, dysuria, or urinary hesitancy. Normal urinary frequency. No nocturia, abnormal vaginal bleeding, vaginal discharge, spotting, pelvic pain, or vaginal leakage    Musculoskeletal: No arthralgia, arthritis, joint swelling, muscle cramping, muscle weakness, neck pain, arm pain, or leg pain    Neurological: No transient paralysis, weakness, paresthesias, or seizures. No syncope, tremors, vertigo, loss of sensation, difficulty walking, loss of consciousness, hemiparesis, confusion, or facial palsy    Psychiatric: No suicidal ideation, depression, anxiety, insomnia, memory loss, paranoia, mood swings, agitation, hallucinations, or hyperactivity    Hematology: No gum bleeding, nose bleeding, or skin lumps    Lymphatic: No enlarged or tender lymph nodes. No extremity swelling    Endocrine: No heat or cold intolerance    Immunologic: No recurrent or persistent infections General: No fever, chills,   Skin: No rashes, itching, or dryness.   Breast: No tenderness, lumps, or nipple discharge      Ophthalmologic: No diplopia,  ENMT Symptoms: No hearing difficulty, ear pain, tinnitus, or vertigo. No sinus symptoms, nasal congestion, nasal   discharge, or nasal obstruction    Respiratory and Thorax: No wheezing, dyspnea, cough,   Cardiovascular: No chest pain, palpitations, dyspnea on exertion,     Gastrointestinal: No nausea, vomiting, diarrhea, constipation,   Genitourinary/ Pelvis: No hematuria,  No abnormal vaginal bleeding,   Musculoskeletal: Pt c/o of lower back pain now at end of pregnancy     Neurological: No transient paralysis, weakness, paresthesias, or seizures. No syncope, tremors, vertigo, loss of sensation, difficulty walking, loss of consciousness, hemiparesis, confusion, or facial palsy    Psychiatric: No suicidal ideation, depression, anxiety,   Hematology: No gum bleeding, nose bleeding, or skin lumps    Lymphatic: No enlarged or tender lymph nodes. No extremity swelling    Endocrine: No heat or cold intolerance    Immunologic: No recurrent or persistent infections

## 2024-09-06 NOTE — OB PST NOTE - NSHPPHYSICALEXAM_GEN_ALL_CORE
Constitutional: Well Developed, Well Groomed, Well Nourished, No Distress    Eyes: PERRL, EOMI, conjunctiva clear    Ears: Normal    Mouth & Gums: Normal, moist    Neck: Supple,   Breast: Normal shape,   Back: Normal shape, ROM intact, strength intact,     Respiratory: Airway patent, breath sounds equal, good air movement, respiration non-labored, clear to auscultation bilateral,   Cardiovascular:  Regular rate and rhythm, no rubs or murmur, normal PMI    Gastrointestinal:  abdomen   Extremities: No clubbing, cyanosis, or pedal edema    Vascular:  Radial Pulse normal,   Neurological: alert & oriented x 3,   Skin: warm and dry, normal color    Lymph Nodes: normal posterior cervical lymph node, normal anterior cervical lymph node, normal supraclavicular lymph node,   Musculoskeletal: ROM intact, no joint swelling, warmth, or calf tenderness. Normal strength    Psychiatric: normal affect, normal behavior

## 2024-09-07 LAB
BASOPHILS # BLD AUTO: 0.04 K/UL — SIGNIFICANT CHANGE UP (ref 0–0.2)
BASOPHILS NFR BLD AUTO: 0.4 % — SIGNIFICANT CHANGE UP (ref 0–2)
EOSINOPHIL # BLD AUTO: 0.08 K/UL — SIGNIFICANT CHANGE UP (ref 0–0.5)
EOSINOPHIL NFR BLD AUTO: 0.8 % — SIGNIFICANT CHANGE UP (ref 0–6)
HCT VFR BLD CALC: 39.6 % — SIGNIFICANT CHANGE UP (ref 34.5–45)
HGB BLD-MCNC: 12.6 G/DL — SIGNIFICANT CHANGE UP (ref 11.5–15.5)
IMM GRANULOCYTES NFR BLD AUTO: 1.5 % — HIGH (ref 0–0.9)
LYMPHOCYTES # BLD AUTO: 2.28 K/UL — SIGNIFICANT CHANGE UP (ref 1–3.3)
LYMPHOCYTES # BLD AUTO: 23.5 % — SIGNIFICANT CHANGE UP (ref 13–44)
MCHC RBC-ENTMCNC: 27.6 PG — SIGNIFICANT CHANGE UP (ref 27–34)
MCHC RBC-ENTMCNC: 31.8 GM/DL — LOW (ref 32–36)
MCV RBC AUTO: 86.7 FL — SIGNIFICANT CHANGE UP (ref 80–100)
MONOCYTES # BLD AUTO: 0.65 K/UL — SIGNIFICANT CHANGE UP (ref 0–0.9)
MONOCYTES NFR BLD AUTO: 6.7 % — SIGNIFICANT CHANGE UP (ref 2–14)
NEUTROPHILS # BLD AUTO: 6.49 K/UL — SIGNIFICANT CHANGE UP (ref 1.8–7.4)
NEUTROPHILS NFR BLD AUTO: 67.1 % — SIGNIFICANT CHANGE UP (ref 43–77)
PLATELET # BLD AUTO: 227 K/UL — SIGNIFICANT CHANGE UP (ref 150–400)
RBC # BLD: 4.57 M/UL — SIGNIFICANT CHANGE UP (ref 3.8–5.2)
RBC # FLD: 14.1 % — SIGNIFICANT CHANGE UP (ref 10.3–14.5)
WBC # BLD: 9.69 K/UL — SIGNIFICANT CHANGE UP (ref 3.8–10.5)
WBC # FLD AUTO: 9.69 K/UL — SIGNIFICANT CHANGE UP (ref 3.8–10.5)

## 2024-09-09 ENCOUNTER — APPOINTMENT (OUTPATIENT)
Dept: OBGYN | Facility: CLINIC | Age: 19
End: 2024-09-09

## 2024-09-10 ENCOUNTER — APPOINTMENT (OUTPATIENT)
Dept: OBGYN | Facility: CLINIC | Age: 19
End: 2024-09-10
Payer: MEDICAID

## 2024-09-10 VITALS
BODY MASS INDEX: 34.96 KG/M2 | DIASTOLIC BLOOD PRESSURE: 68 MMHG | WEIGHT: 190 LBS | HEART RATE: 90 BPM | HEIGHT: 62 IN | SYSTOLIC BLOOD PRESSURE: 101 MMHG

## 2024-09-10 PROCEDURE — 99213 OFFICE O/P EST LOW 20 MIN: CPT | Mod: TH

## 2024-09-14 ENCOUNTER — APPOINTMENT (OUTPATIENT)
Dept: OBGYN | Facility: HOSPITAL | Age: 19
End: 2024-09-14

## 2024-09-14 ENCOUNTER — INPATIENT (INPATIENT)
Facility: HOSPITAL | Age: 19
LOS: 2 days | Discharge: HOME | End: 2024-09-17
Attending: OBSTETRICS & GYNECOLOGY | Admitting: OBSTETRICS & GYNECOLOGY

## 2024-09-14 VITALS
RESPIRATION RATE: 18 BRPM | SYSTOLIC BLOOD PRESSURE: 113 MMHG | WEIGHT: 190.04 LBS | DIASTOLIC BLOOD PRESSURE: 77 MMHG | HEIGHT: 62 IN | HEART RATE: 90 BPM | TEMPERATURE: 98 F

## 2024-09-14 DIAGNOSIS — Z34.90 ENCOUNTER FOR SUPERVISION OF NORMAL PREGNANCY, UNSPECIFIED, UNSPECIFIED TRIMESTER: ICD-10-CM

## 2024-09-14 DIAGNOSIS — Z98.891 HISTORY OF UTERINE SCAR FROM PREVIOUS SURGERY: Chronic | ICD-10-CM

## 2024-09-14 LAB
BASOPHILS # BLD AUTO: 0.05 K/UL — SIGNIFICANT CHANGE UP (ref 0–0.2)
BASOPHILS NFR BLD AUTO: 0.4 % — SIGNIFICANT CHANGE UP (ref 0–2)
BLD GP AB SCN SERPL QL: NEGATIVE — SIGNIFICANT CHANGE UP
EOSINOPHIL # BLD AUTO: 0.12 K/UL — SIGNIFICANT CHANGE UP (ref 0–0.5)
EOSINOPHIL NFR BLD AUTO: 1.1 % — SIGNIFICANT CHANGE UP (ref 0–6)
HCT VFR BLD CALC: 37.3 % — SIGNIFICANT CHANGE UP (ref 34.5–45)
HGB BLD-MCNC: 12.4 G/DL — SIGNIFICANT CHANGE UP (ref 11.5–15.5)
IANC: 6.62 K/UL — SIGNIFICANT CHANGE UP (ref 1.8–7.4)
IMM GRANULOCYTES NFR BLD AUTO: 1.3 % — HIGH (ref 0–0.9)
LYMPHOCYTES # BLD AUTO: 3.39 K/UL — HIGH (ref 1–3.3)
LYMPHOCYTES # BLD AUTO: 30.5 % — SIGNIFICANT CHANGE UP (ref 13–44)
MCHC RBC-ENTMCNC: 28.2 PG — SIGNIFICANT CHANGE UP (ref 27–34)
MCHC RBC-ENTMCNC: 33.2 GM/DL — SIGNIFICANT CHANGE UP (ref 32–36)
MCV RBC AUTO: 84.8 FL — SIGNIFICANT CHANGE UP (ref 80–100)
MONOCYTES # BLD AUTO: 0.81 K/UL — SIGNIFICANT CHANGE UP (ref 0–0.9)
MONOCYTES NFR BLD AUTO: 7.3 % — SIGNIFICANT CHANGE UP (ref 2–14)
NEUTROPHILS # BLD AUTO: 6.62 K/UL — SIGNIFICANT CHANGE UP (ref 1.8–7.4)
NEUTROPHILS NFR BLD AUTO: 59.4 % — SIGNIFICANT CHANGE UP (ref 43–77)
NRBC # BLD: 0 /100 WBCS — SIGNIFICANT CHANGE UP (ref 0–0)
NRBC # FLD: 0 K/UL — SIGNIFICANT CHANGE UP (ref 0–0)
PLATELET # BLD AUTO: 206 K/UL — SIGNIFICANT CHANGE UP (ref 150–400)
RBC # BLD: 4.4 M/UL — SIGNIFICANT CHANGE UP (ref 3.8–5.2)
RBC # FLD: 13.6 % — SIGNIFICANT CHANGE UP (ref 10.3–14.5)
RH IG SCN BLD-IMP: POSITIVE — SIGNIFICANT CHANGE UP
T PALLIDUM AB TITR SER: NEGATIVE — SIGNIFICANT CHANGE UP
WBC # BLD: 11.13 K/UL — HIGH (ref 3.8–10.5)
WBC # FLD AUTO: 11.13 K/UL — HIGH (ref 3.8–10.5)

## 2024-09-14 PROCEDURE — 59514 CESAREAN DELIVERY ONLY: CPT | Mod: U9,GC

## 2024-09-14 RX ORDER — HEPARIN SODIUM,BOVINE 1000/ML
5000 VIAL (ML) INJECTION EVERY 12 HOURS
Refills: 0 | Status: DISCONTINUED | OUTPATIENT
Start: 2024-09-14 | End: 2024-09-17

## 2024-09-14 RX ORDER — OXYCODONE HYDROCHLORIDE 5 MG/1
5 TABLET ORAL ONCE
Refills: 0 | Status: DISCONTINUED | OUTPATIENT
Start: 2024-09-14 | End: 2024-09-17

## 2024-09-14 RX ORDER — DIPHENHYDRAMINE HCL 50 MG
25 CAPSULE ORAL EVERY 6 HOURS
Refills: 0 | Status: DISCONTINUED | OUTPATIENT
Start: 2024-09-14 | End: 2024-09-17

## 2024-09-14 RX ORDER — TETANUS TOXOID, REDUCED DIPHTHERIA TOXOID AND ACELLULAR PERTUSSIS VACCINE, ADSORBED 5; 2.5; 8; 8; 2.5 [IU]/.5ML; [IU]/.5ML; UG/.5ML; UG/.5ML; UG/.5ML
0.5 SUSPENSION INTRAMUSCULAR ONCE
Refills: 0 | Status: COMPLETED | OUTPATIENT
Start: 2024-09-14

## 2024-09-14 RX ORDER — OXYCODONE HYDROCHLORIDE 5 MG/1
10 TABLET ORAL
Refills: 0 | Status: DISCONTINUED | OUTPATIENT
Start: 2024-09-14 | End: 2024-09-15

## 2024-09-14 RX ORDER — OXYTOCIN 10 UNIT/ML
167 AMPUL (ML) INJECTION
Qty: 30 | Refills: 0 | Status: DISCONTINUED | OUTPATIENT
Start: 2024-09-14 | End: 2024-09-16

## 2024-09-14 RX ORDER — LANOLIN
1 OINTMENT (GRAM) TOPICAL EVERY 6 HOURS
Refills: 0 | Status: DISCONTINUED | OUTPATIENT
Start: 2024-09-14 | End: 2024-09-17

## 2024-09-14 RX ORDER — OXYCODONE HYDROCHLORIDE 5 MG/1
5 TABLET ORAL
Refills: 0 | Status: COMPLETED | OUTPATIENT
Start: 2024-09-14 | End: 2024-09-21

## 2024-09-14 RX ORDER — SODIUM CITRATE AND CITRIC ACID MONOHYDRATE 334; 500 MG/5ML; MG/5ML
30 SOLUTION ORAL ONCE
Refills: 0 | Status: COMPLETED | OUTPATIENT
Start: 2024-09-14 | End: 2024-09-14

## 2024-09-14 RX ORDER — ACETAMINOPHEN 325 MG/1
975 TABLET ORAL EVERY 6 HOURS
Refills: 0 | Status: COMPLETED | OUTPATIENT
Start: 2024-09-14 | End: 2025-08-13

## 2024-09-14 RX ORDER — FLU VACCINE TS 2012-2013(5YR+) 45MCG/.5ML
0.5 VIAL (ML) INTRAMUSCULAR ONCE
Refills: 0 | Status: COMPLETED | OUTPATIENT
Start: 2024-09-14 | End: 2024-09-15

## 2024-09-14 RX ORDER — ACETAMINOPHEN 325 MG/1
1000 TABLET ORAL EVERY 6 HOURS
Refills: 0 | Status: COMPLETED | OUTPATIENT
Start: 2024-09-14 | End: 2024-09-15

## 2024-09-14 RX ADMIN — CHLORHEXIDINE GLUCONATE 1 APPLICATION(S): 40 SOLUTION TOPICAL at 08:22

## 2024-09-14 RX ADMIN — ACETAMINOPHEN 400 MILLIGRAM(S): 325 TABLET ORAL at 19:24

## 2024-09-14 RX ADMIN — Medication 200 MILLILITER(S): at 08:22

## 2024-09-14 RX ADMIN — Medication 5000 UNIT(S): at 19:23

## 2024-09-14 RX ADMIN — SODIUM CITRATE AND CITRIC ACID MONOHYDRATE 30 MILLILITER(S): 334; 500 SOLUTION ORAL at 08:21

## 2024-09-14 RX ADMIN — ACETAMINOPHEN 1000 MILLIGRAM(S): 325 TABLET ORAL at 20:00

## 2024-09-14 NOTE — OB RN INTRAOPERATIVE NOTE - NSRNPREP_OBGYN_ALL_OB
----- Message from Shay Odonnell MD sent at 6/16/2019  2:38 PM CDT -----  Please call patient to inform them that their blood counts, electrolytes, kidney and liver function are all normal, and routine pregnancy test is negative.  Please continue the plan of care discussed with your provider.      Yes

## 2024-09-14 NOTE — OB PROVIDER DELIVERY SUMMARY - NSPROVIDERDELIVERYNOTE_OBGYN_ALL_OB_FT
scheduled rLTCS    Viable female infant, 3070g, 9/9 APGARS, cephalic presentation.    Hysterotomy was closed in one layer with 0 Vicryl. Peritoneum reapproximated with 2-0 plain gut. Fibrillar placed over hysterotomy, bladder flap, and rectus muscles. Fascia closed with 0 Vicryl. Subcutaneous reapproximated with 2-0 Vicryl in 2 layers. Subcuticular skin closure with 3-0 monocryl.    QBL: 596  IVF: 1900  UOP: 175 scheduled rLTCS    Viable female infant, 3070g, 9/9 APGARS, cephalic presentation.    Hysterotomy was closed in one layer with 0 Vicryl. Peritoneum reapproximated with 2-0 plain gut. Fibrillar placed over hysterotomy, bladder flap, and rectus muscles. Fascia closed with 0 Vicryl. Subcutaneous reapproximated with 2-0 Vicryl in 2 layers. Subcuticular skin closure with 3-0 monocryl.    QBL: 596  IVF: 1900  UOP: 175        As above/ Yelena Gerardo MD

## 2024-09-14 NOTE — OB RN PATIENT PROFILE - LIVING CHILDREN, OB PROFILE
1  Over-the-counter ibuprofen and/or acetaminophen as needed for pain or fever  2  Oxymetazoline nasal spray 2 sprays in each nostril every 12 hours for no more than the next 5 days as needed for nasal congestion  3  Over-the-counter guaifenesin as needed for mucus relief  4  Gargle salt water as needed for sore throat relief  5  Increase oral fluid consumption  6  Follow-up with primary care provider in 7 days for any persistent symptoms  1

## 2024-09-14 NOTE — OB RN INTRAOPERATIVE NOTE - NSSELHIDDEN_OBGYN_ALL_OB_FT
[NS_DeliveryAttending1_OBGYN_ALL_OB_FT:[id number]],[NS_DeliveryRN_OBGYN_ALL_OB_FT:[id number]] [NS_DeliveryAttending1_OBGYN_ALL_OB_FT:[id number]],[NS_DeliveryRN_OBGYN_ALL_OB_FT:[id number]],[NS_DeliveryAssist1_OBGYN_ALL_OB_FT:[id number]]

## 2024-09-14 NOTE — OB PROVIDER H&P - ASSESSMENT
A/P: 18y  at 39.2 weeks GA presents to L&D for scheduled repeat  section.  -Admit to L&D  -Consent signed  -Preop/routine labs  -NPO  -IV fluids  -Fetus: NST reactive. Continuous toco and fetal monitoring.   -GBS: Negative  -Anesthesia consult  -Plan for repeat C/S  -Moderate PPH risk 2/2 prior C/S. 2u pRBC on hold    Discussed with Dr. Krause, attending    Juliana Blanc, PGY-1 A/P: 18y  at 39.2 weeks GA presents to L&D for scheduled repeat  section.  -Admit to L&D  -Consent signed  -Preop/routine labs  -NPO  -IV fluids  -Fetus: NST reactive. Continuous toco and fetal monitoring.   -GBS: Negative  -Anesthesia consult  -Plan for repeat C/S    Discussed with Dr. Krause, attending    Juliana Blanc, PGY-1 A/P: 18y  at 39.2 weeks GA presents to L&D for scheduled repeat  section.  -Admit to L&D  -Consent signed  -Preop/routine labs  -NPO  -IV fluids  -Fetus: NST reactive. Continuous toco and fetal monitoring.   -GBS: Negative  -Anesthesia consult  -Plan for repeat C/S    Discussed with Dr. Gerardo, attending    Juliana Blanc, PGY-1

## 2024-09-14 NOTE — OB PROVIDER H&P - PRO RUBELLA INFANT
Pediatric Well Adolescent Exam      Chief Complaint   Patient presents with    Office Visit    Well Adolescent    Well Adolescent       SUBJECTIVE:Familia is a 15 year old  male who presents for a well child exam.   Patient presents with mother stayed for the non sensitive portions of the visit    CONCERNS RAISED TODAY:     Cold symptoms- given augmentin and albuterol 1/2024 for sinusitis. He notes that he's had a chronic cough since that infection, overall mild. Now he's coughing more, stuffy nose, sore throat. No fevers, trouble breathing. Just had Prom on Saturday and was yelling a lot.   Shoulder twitch- Started a coupe weeks ago. Seems overall getting better, doesn't really hurt. Has been trying to exercise more and eating more protein. History of med-induced tics that have resolved.   Autism spectrum disorder, ADHD, Depression, anxiety, motor tics- Has done [facility] in the past. He follows with psychiatry at [facility]. Taking clonidine, guanfacine, concerta for ADHD/insomnia/motor tics, buspar for Anxiety. Has IEP at school. Per mom he has very strong routine with exercise, meditation etc. 4  Headaches- Following at headache clinic at [facility]. Naproxen, excedrin PRN  History of pre-epileptic activity, L middle cranial fossa arachnoid cyst- Abnormal EEG. Cyst grew appropriately to head size from 2009 to 2013. Still following with Neurology  History of syncope- Evaluated by [facility]. Thought likely to be secondary to orthostasis with component of mydayis. To see Cardiology PRN  BMI 94th percentile- Body mass index is 24.51 kg/m².    Past Histories:  Problem List, Allergies, Medications, Medical history, Surgical history, Family history reviewed and updated.  Yes      Social History     Socioeconomic History    Marital status: Single     Spouse name: Not on file    Number of children: Not on file    Years of education: Not on file    Highest education level: Not on file   Occupational History    Not  on file   Tobacco Use    Smoking status: Never    Smokeless tobacco: Never   Substance and Sexual Activity    Alcohol use: Never    Drug use: Never    Sexual activity: Never     Partners: Female   Other Topics Concern    Not on file   Social History Narrative    2024: Lives with dad, mom, sister. He's in 11th grade. Going alright has IEP. Was in volleyball.      Social Determinants of Health     Financial Resource Strain: Not on file   Food Insecurity: Not on file   Transportation Needs: Not on file   Physical Activity: Not on file   Stress: Not on file   Social Connections: Not on file   Interpersonal Safety: Not on file           Recent PHQ 2/9 Score    PHQ 2:  PHQ 2 Score Peds PHQ 2 Score Peds PHQ 2 Interpretation   4/6/2023   2:51 PM 6 Further screening needed       PHQ 9:  PHQ 9 Score Peds PHQ 9 Score Peds PHQ 9 Interpretation   4/6/2023   2:51 PM 19 Moderately Severe Depression       DEPRESSION ASSESSMENT/PLAN:  Following with Behavioral health         Review of Systems:   GI: Constipation/ Abdominal complaints:    no significant complaints, denies constipation  Acne:   No    Other ROS:    No other concerns    Vision screening:   Vision Screening    Right eye Left eye Both eyes   Without correction 20/20 20/20    With correction           PHYSICAL EXAM:   Vitals:    05/02/24 1641   BP: 122/70   BP Location: LUE - Left upper extremity   Patient Position: Sitting   Cuff Size: Regular   Pulse: 94   Temp: 97.5 °F (36.4 °C)   TempSrc: Oral   SpO2: 98%   Weight: 75.3 kg (166 lb)   Height: 5' 9\" (1.753 m)       Blood pressure reading is in the elevated blood pressure range (BP >= 120/80) based on the 2017 AAP Clinical Practice Guideline.    Body mass index is 24.51 kg/m².  82.6 %ile (Z= 0.94) based on CDC (Boys, 2-20 Years) BMI-for-age based on BMI available as of 5/2/2024.    General: Alert, no apparent distress  Skin:  No rashes or abnormal dyspigmentation  Head:  Normocephalic, atraumatic, no obvious  abnormalities  Eyes: sclera clear, conjugate gaze, OSCAR  Ears:TMs easily visualized, normal bilaterally  Nose:nasal mucosa appears normal, turbinates not swollen, no rhinnorhea  Mouth / Throat: oral mucosa normal without lesions, pharynx clear  Neck: normal, supple, no masses, no adenopathy  Lungs:  good air movement, clear, equal breath sounds bilaterally to auscultation  Heart: regular rate & rhythm, normal S1 and S2, no murmurs and brachial and femoral pulses symmetrical, strong  Abdomen:soft, nontender, without masses or hepatosplenomegaly and normoactive bowel sounds  Back: spine straight on forward bend  : Julian stage 4, normal male genitalia, bilaterally descended testes  Extremities:no obvious abnormalities per inspection of all 4 extremities  Neuro: alert and cooperative, appropriate affect, grossly normal exam, normal gait observed      ASSESSMENT/PLAN:  Patient is a 15 year old male with healthy growth and development.     Recurrent major depressive disorder, remission status unspecified (CMD), Attention deficit hyperactivity disorder (ADHD), unspecified ADHD type, Anxiety disorder, unspecified type, Autism spectrum disorder (CMD): Overall doing well, stable. Continue following with behavioral health    Arachnoid cyst:  reasonable to see Neurology again, unclear if this requires further monitoring    Need for vaccination: will come back for shot visit  - Meningococcal Conjugate MCV40 (Menveo) - [id number]; Standing  - Meningococcal Serogroup B Recombinant 2-Dose Series (Bexsero) - [id number]; Standing    Well adolescent visit without abnormal findings  - Anticipatory guidance provided  - Return in about 1 year (around 5/2/2025) for Well Visit .    Carlos Aguiar MD  Internal Medicine / Pediatrics         immune

## 2024-09-14 NOTE — OB POSTPARTUM EVENT NOTE - NS_EVENTSUMMARY1_OBGYN_ALL_OB_FT
pt s/p repeat c/section at 39.2, now   QBL in , fundus firm and at umbilicus.  dressing C/D/I.  lochia light. rader output 350 ml, 150 ml, and 125 ml for previous 2 hours   pt medicated as ordered for complaints of pain
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
## 2024-09-14 NOTE — OB RN DELIVERY SUMMARY - NS_SEPSISRSKCALC_OBGYN_ALL_OB_FT
EOS calculated successfully. EOS Risk Factor: 0.5/1000 live births ([facility] national incidence); GA=39w2d; Temp=98.42; ROM=0.017; GBS='Negative'; Antibiotics='No antibiotics or any antibiotics < 2 hrs prior to birth'

## 2024-09-14 NOTE — OB PROVIDER H&P - NSLOWPPHRISK_OBGYN_A_OB
[place] Pregnancy/Less than or equal to 4 previous vaginal births/No known bleeding disorder/No history of postpartum hemorrhage

## 2024-09-14 NOTE — OB PROVIDER DELIVERY SUMMARY - NSSELHIDDEN_OBGYN_ALL_OB_FT
[NS_DeliveryAttending1_OBGYN_ALL_OB_FT:[id number]],[NS_DeliveryRN_OBGYN_ALL_OB_FT:[id number]]

## 2024-09-14 NOTE — OB PROVIDER H&P - NSRISKFACTORS_OBGYN_ALL_OB
No
What Type Of Note Output Would You Prefer (Optional)?: Standard Output
Hpi Title: Evaluation of Skin Lesions

## 2024-09-14 NOTE — OB RN DELIVERY SUMMARY - NSSELHIDDEN_OBGYN_ALL_OB_FT
[NS_DeliveryAttending1_OBGYN_ALL_OB_FT:[id number]],[NS_DeliveryRN_OBGYN_ALL_OB_FT:[id number]]

## 2024-09-14 NOTE — OB PROVIDER H&P - NS_EFW_OBGYN_ALL_OB_FT
HPI Comments: Marge Schneider is a 1 y.o. female with history of stroke who presents ambulatory with her mother to ED c/o productive cough for the past week, along with associated congestion, rhinorrhea with green discharge, subjective fever, vomiting, decreased appetite, and decreased urination. Mother states that 2 days ago city inspectors came to their residence for a gas leak, and it was determined that carbon monoxide was present at toxic levels, and they have not been back to that location since then. Mother states the pt has been drinking fluids normally, but she has been urinating less frequently with the last time being this morning. PCP:  Tamie Trujillo MD    There are no other complaints, changes or physical findings at this time. Written by José Antonio Armenta. JING Dougherty, as dictated by Poonam Snowden MD.    The history is provided by the mother. No  was used. Pediatric Social History:         History reviewed. No pertinent past medical history. History reviewed. No pertinent past surgical history. History reviewed. No pertinent family history. Social History     Social History    Marital status: SINGLE     Spouse name: N/A    Number of children: N/A    Years of education: N/A     Occupational History    Not on file. Social History Main Topics    Smoking status: Never Smoker    Smokeless tobacco: Not on file    Alcohol use No    Drug use: No    Sexual activity: Not on file     Other Topics Concern    Not on file     Social History Narrative    No narrative on file         ALLERGIES: Review of patient's allergies indicates no known allergies. Review of Systems   Constitutional: Positive for appetite change and fever. Negative for activity change, crying, diaphoresis and irritability. HENT: Positive for congestion and rhinorrhea. Negative for facial swelling and voice change. Eyes: Negative for redness. Respiratory: Positive for cough. Negative for wheezing. Cardiovascular: Negative for chest pain and cyanosis. Gastrointestinal: Negative for abdominal pain, diarrhea and vomiting. Genitourinary: Positive for decreased urine volume. Negative for dysuria. Musculoskeletal: Negative for myalgias. Skin: Negative for pallor and rash. Neurological: Negative for seizures and facial asymmetry. Vitals:    02/09/17 1700 02/09/17 1753 02/09/17 1800 02/09/17 1900   Pulse:  100     Resp:       Temp:       SpO2: 98% 97% 96% 96%   Weight:                Physical Exam   Constitutional: She appears well-developed and well-nourished. She is active. HENT:   Nose: Nasal discharge (thick greenish) present. Mouth/Throat: Mucous membranes are moist. No tonsillar exudate. Oropharynx is clear. Eyes: Conjunctivae and EOM are normal. Pupils are equal, round, and reactive to light. Right eye exhibits no discharge. Left eye exhibits no discharge. Neck: Normal range of motion. Neck supple. No adenopathy. Cardiovascular: Normal rate and regular rhythm. Pulses are strong. No murmur heard. Pulmonary/Chest: Effort normal and breath sounds normal. No nasal flaring or stridor. No respiratory distress. She has no wheezes. She has no rhonchi. She has no rales. She exhibits no retraction. Abdominal: Soft. Bowel sounds are normal. She exhibits no distension. There is no hepatosplenomegaly. There is no tenderness. Musculoskeletal: Normal range of motion. Neurological: She is alert. She exhibits normal muscle tone. Skin: Skin is warm. No petechiae and no rash noted. She is not diaphoretic. No cyanosis. No pallor. Nursing note and vitals reviewed.        MDM  Number of Diagnoses or Management Options  Diagnosis management comments: DDx: pneumonia, URI, carbon monoxide poisoning        Amount and/or Complexity of Data Reviewed  Clinical lab tests: reviewed and ordered  Tests in the radiology section of CPT®: reviewed and ordered  Review and summarize past medical records: yes  Discuss the patient with other providers: yes (Poison Control)    Patient Progress  Patient progress: stable    ED Course       Procedures    4:20 PM  Yamil Christina MD spoke with Poison Control, who recommends checking the carboxy hemoglobin levels, and providing supportive care. SIGN OUT:  4:58 PM  Patient's presentation, labs/imaging and plan of care was reviewed with Diony Singh MD as part of sign out. They will review carboxy hemoglobin level as part of the plan discussed with the patient. Diony Singh MD's assistance in completion of this plan is greatly appreciated but it should be noted that I will be the provider of record for this patient. Yamil Christina MD    Attestations: This note is prepared by Yue Rust, acting as Scribe for Yamil Christina MD.    Yamil Christina MD: The scribe's documentation has been prepared under my direction and personally reviewed by me in its entirety. I confirm that the note above accurately reflects all work, treatment, procedures, and medical decision making performed by me. Progress Note:  8:33 PM  Poison Control is in agreement with the pt's discharge, after being made aware of the pt's carboxy hemoglobin results. Written by Rubens [place], ED Scribe, as dictated by Diony Singh MD.     LABORATORY TESTS:  Recent Results (from the past 12 hour(s))   CARBOXY HEMOGLOBIN    Collection Time: 02/09/17  5:07 PM   Result Value Ref Range    Carboxy hgb 0.4 (L) 1 - 2 %    Methemoglobin 0.3 0 - 1.4 %    tHb 13.9 (L) 14 - 17 g/dL    Oxyhemoglobin 98.5 (H) 94 - 97 %    O2 SATURATION 99 95 - 99 %       IMAGING RESULTS:  CXR Results  (Last 48 hours)               02/09/17 1625  XR CHEST PORT Final result    Impression:  IMPRESSION:       There is no acute process. Narrative:  EXAM:  XR CHEST PORT       INDICATION:  Carbon monoxide in house.        COMPARISON: None       TECHNIQUE: Portable AP upright chest view at 1625 hours       FINDINGS: The cardiothymic and hilar contours are within normal limits. The   pulmonary vasculature is within normal limits. The lungs and pleural spaces are clear. The visualized bones and upper abdomen   are age-appropriate. MEDICATIONS GIVEN:  Medications   cetirizine (ZYRTEC) 5 mg/5 mL solution 2.5 mg (2.5 mg Oral Given 2/9/17 1650)       IMPRESSION:  1. Exposure to carbon monoxide    2. Acute upper respiratory infection        PLAN:  1. Current Discharge Medication List      START taking these medications    Details   cetirizine (ZYRTEC) 5 mg/5 mL solution Take 2.5 mL by mouth daily. Indications: cough, congestion  Qty: 50 mL, Refills: 0      amoxicillin (AMOXIL) 250 mg/5 mL suspension Take 5 mL by mouth three (3) times daily for 10 days. Qty: 150 mL, Refills: 0      fluticasone (VERAMYST) 27.5 mcg/actuation nasal spray 1 [place] by Both Nostrils route daily for 7 days. Indications: runny nose, congestion  Qty: 1 Bottle, Refills: 0           2. Follow-up Information     Follow up With Details Comments Contact Info    [facility] EMERGENCY DEPT  If symptoms worsen [address] [place] Call in 1 day If symptoms worsen [address]  Monica Orlando MD In 3 days  [address] [facility] [phone]          Return to ED if worse   DISCHARGE NOTE:  8:33 PM  The patient is ready for discharge. The patient's signs, symptoms, diagnosis, and discharge instructions have been discussed and the patient and/or family has conveyed their understanding. The patient and/or family is to follow up as recommended or return to the ER should their symptoms worsen. Plan has been discussed and the patient and/or family is in agreement. Written by Lestine Ip Prudence Nyhan, ED Scribe, as dictated by Salina Velasquez MD.     Attestation: This note is prepared by Thomas Burgos.  Prudence Nyhan, acting as Scribe for Maria Esther Hardy MD.    Maria Esther Hardy MD: The scribe's documentation has been prepared under my direction and personally reviewed by me in its entirety. I confirm that the note above accurately reflects all work, treatment, procedures, and medical decision making performed by me. [id number]

## 2024-09-14 NOTE — OB PROVIDER H&P - NSHPPHYSICALEXAM_GEN_ALL_CORE
T(C): --  HR: 90 (09-14-24 @ 08:06) (90 - 90)  BP: 113/77 (09-14-24 @ 08:06) (113/77 - 113/77)  RR: --  SpO2: --  Gen: NAD, well-appearing    Abd: Soft, gravid  SVE: 1/0/-3  Bedside sono: vertex  FHT: baseline 135bpm, moderate variability, +accels, -decels  [place]: no ctx

## 2024-09-14 NOTE — OB PROVIDER H&P - HISTORY OF PRESENT ILLNESS
18y  at 39.2 weeks GA presents to L&D for scheduled repeat . Patient's first  was d/t genital herpes outbreak. Patient denies an outbreak during this pregnancy, and is currently on Valtrex 500mg BID. Patient denies vaginal bleeding, contractions and leakage of fluid. She endorses good fetal movement. Denies fevers, chills, nausea and vomiting. No other complaints at this time. Prenatal course uncomplicated.    MELANIE: 2024    POB: pLTCS  d/t genital herpes outbreak - FT 6#13  PGYN: denies fibroids, ovarian cysts, STD hx, or abnormal PAPs   PMH: bilateral sciatica pain  PSH: pLTCS   SH: Denies EtOH, tobacco and illicit drug use during this pregnancy; feels safe at home   Psych Hx: denies hx of anxiety or depression  Meds: PNVs, Valtrex 500mg BID  Allergies: NKDA    EFW: 3168 (extrapolated from  sono)    GBS: negative           18y  at 39.2 weeks GA presents to L&D for scheduled repeat . Patient's first  was d/t genital herpes outbreak. Patient denies an outbreak during this pregnancy, and is currently on Valtrex 500mg BID. Patient denies vaginal bleeding, contractions and leakage of fluid. She endorses good fetal movement. Denies fevers, chills, nausea and vomiting. No other complaints at this time. Prenatal course uncomplicated.    MELANIE: 2024    POB: pLTCS  d/t genital herpes outbreak - FT 6#13  PGYN: denies fibroids, ovarian cysts, STD hx, or abnormal PAPs   PMH: bilateral sciatica pain  PSH: pLTCS   SH: Denies EtOH, tobacco and illicit drug use during this pregnancy; feels safe at home   Psych Hx: denies hx of anxiety or depression  Meds: PNVs, Valtrex 500mg BID  Allergies: Motrin (rash)    EFW: 3168 (extrapolated from  sono)    GBS: negative           18y  at 39.2 weeks GA presents to L&D for scheduled repeat . Patient's first  was d/t genital herpes outbreak. Patient denies an outbreak during this pregnancy, and is currently on Valtrex 500mg BID. Patient denies vaginal bleeding, contractions and leakage of fluid. She endorses good fetal movement. Denies fevers, chills, nausea and vomiting. No other complaints at this time. Prenatal course uncomplicated.    MELANIE: 2024    POB: pLTCS  d/t genital herpes outbreak - FT 6#13  PGYN: h/o genital herpes on Valtrex; last outbreak in previous pregnancy ; denies fibroids, ovarian cysts, STD hx, or abnormal PAPs   PMH: bilateral sciatica pain  PSH: pLTCS   SH: Denies EtOH, tobacco and illicit drug use during this pregnancy; feels safe at home   Psych Hx: denies hx of anxiety or depression  Meds: PNVs, Valtrex 500mg BID  Allergies: Motrin (rash)    EFW: 3168 (extrapolated from  sono)    GBS: negative

## 2024-09-15 LAB
BASOPHILS # BLD AUTO: 0.03 K/UL — SIGNIFICANT CHANGE UP (ref 0–0.2)
BASOPHILS NFR BLD AUTO: 0.2 % — SIGNIFICANT CHANGE UP (ref 0–2)
EOSINOPHIL # BLD AUTO: 0.05 K/UL — SIGNIFICANT CHANGE UP (ref 0–0.5)
EOSINOPHIL NFR BLD AUTO: 0.4 % — SIGNIFICANT CHANGE UP (ref 0–6)
HCT VFR BLD CALC: 35.4 % — SIGNIFICANT CHANGE UP (ref 34.5–45)
HGB BLD-MCNC: 11.5 G/DL — SIGNIFICANT CHANGE UP (ref 11.5–15.5)
IANC: 8.3 K/UL — HIGH (ref 1.8–7.4)
IMM GRANULOCYTES NFR BLD AUTO: 0.7 % — SIGNIFICANT CHANGE UP (ref 0–0.9)
LYMPHOCYTES # BLD AUTO: 2.97 K/UL — SIGNIFICANT CHANGE UP (ref 1–3.3)
LYMPHOCYTES # BLD AUTO: 24.7 % — SIGNIFICANT CHANGE UP (ref 13–44)
MCHC RBC-ENTMCNC: 27.9 PG — SIGNIFICANT CHANGE UP (ref 27–34)
MCHC RBC-ENTMCNC: 32.5 GM/DL — SIGNIFICANT CHANGE UP (ref 32–36)
MCV RBC AUTO: 85.9 FL — SIGNIFICANT CHANGE UP (ref 80–100)
MONOCYTES # BLD AUTO: 0.58 K/UL — SIGNIFICANT CHANGE UP (ref 0–0.9)
MONOCYTES NFR BLD AUTO: 4.8 % — SIGNIFICANT CHANGE UP (ref 2–14)
NEUTROPHILS # BLD AUTO: 8.3 K/UL — HIGH (ref 1.8–7.4)
NEUTROPHILS NFR BLD AUTO: 69.2 % — SIGNIFICANT CHANGE UP (ref 43–77)
NRBC # BLD: 0 /100 WBCS — SIGNIFICANT CHANGE UP (ref 0–0)
NRBC # FLD: 0 K/UL — SIGNIFICANT CHANGE UP (ref 0–0)
PLATELET # BLD AUTO: 162 K/UL — SIGNIFICANT CHANGE UP (ref 150–400)
RBC # BLD: 4.12 M/UL — SIGNIFICANT CHANGE UP (ref 3.8–5.2)
RBC # FLD: 13.9 % — SIGNIFICANT CHANGE UP (ref 10.3–14.5)
WBC # BLD: 12.01 K/UL — HIGH (ref 3.8–10.5)
WBC # FLD AUTO: 12.01 K/UL — HIGH (ref 3.8–10.5)

## 2024-09-15 RX ORDER — ACETAMINOPHEN 325 MG/1
975 TABLET ORAL EVERY 6 HOURS
Refills: 0 | Status: DISCONTINUED | OUTPATIENT
Start: 2024-09-15 | End: 2024-09-17

## 2024-09-15 RX ORDER — OXYCODONE HYDROCHLORIDE 5 MG/1
5 TABLET ORAL ONCE
Refills: 0 | Status: DISCONTINUED | OUTPATIENT
Start: 2024-09-15 | End: 2024-09-15

## 2024-09-15 RX ORDER — VALACYCLOVIR 500 MG/1
1 TABLET, FILM COATED ORAL
Refills: 0 | DISCHARGE

## 2024-09-15 RX ORDER — TETANUS TOXOID, REDUCED DIPHTHERIA TOXOID AND ACELLULAR PERTUSSIS VACCINE, ADSORBED 5; 2.5; 8; 8; 2.5 [IU]/.5ML; [IU]/.5ML; UG/.5ML; UG/.5ML; UG/.5ML
0.5 SUSPENSION INTRAMUSCULAR ONCE
Refills: 0 | Status: COMPLETED | OUTPATIENT
Start: 2024-09-15 | End: 2024-09-15

## 2024-09-15 RX ADMIN — Medication 5000 UNIT(S): at 17:55

## 2024-09-15 RX ADMIN — ACETAMINOPHEN 1000 MILLIGRAM(S): 325 TABLET ORAL at 03:30

## 2024-09-15 RX ADMIN — ACETAMINOPHEN 400 MILLIGRAM(S): 325 TABLET ORAL at 03:14

## 2024-09-15 RX ADMIN — Medication 80 MILLIGRAM(S): at 13:15

## 2024-09-15 RX ADMIN — Medication 30 MILLILITER(S): at 13:15

## 2024-09-15 RX ADMIN — ACETAMINOPHEN 975 MILLIGRAM(S): 325 TABLET ORAL at 17:55

## 2024-09-15 RX ADMIN — Medication 5000 UNIT(S): at 06:00

## 2024-09-15 RX ADMIN — ACETAMINOPHEN 975 MILLIGRAM(S): 325 TABLET ORAL at 19:00

## 2024-09-15 RX ADMIN — OXYCODONE HYDROCHLORIDE 5 MILLIGRAM(S): 5 TABLET ORAL at 14:00

## 2024-09-15 RX ADMIN — Medication 0.5 MILLILITER(S): at 22:00

## 2024-09-15 RX ADMIN — ACETAMINOPHEN 400 MILLIGRAM(S): 325 TABLET ORAL at 09:03

## 2024-09-15 RX ADMIN — OXYCODONE HYDROCHLORIDE 5 MILLIGRAM(S): 5 TABLET ORAL at 13:15

## 2024-09-15 RX ADMIN — TETANUS TOXOID, REDUCED DIPHTHERIA TOXOID AND ACELLULAR PERTUSSIS VACCINE, ADSORBED 0.5 MILLILITER(S): 5; 2.5; 8; 8; 2.5 SUSPENSION INTRAMUSCULAR at 22:00

## 2024-09-15 RX ADMIN — ACETAMINOPHEN 1000 MILLIGRAM(S): 325 TABLET ORAL at 09:44

## 2024-09-15 NOTE — DISCHARGE NOTE OB - MEDICATION SUMMARY - MEDICATIONS TO TAKE
I will START or STAY ON the medications listed below when I get home from the hospital:    Tylenol 500 mg oral tablet  -- 2 tab(s) by mouth 4 times a day as needed for prn pain  -- Indication: For  delivery   I will START or STAY ON the medications listed below when I get home from the hospital:    Tylenol 500 mg oral tablet  -- 2 tab(s) by mouth every 6 hours as needed for  mild pain  -- Indication: For mild pain

## 2024-09-15 NOTE — DISCHARGE NOTE OB - PATIENT PORTAL LINK FT
You can access the FollowMyHealth Patient Portal offered by [facility] by registering at the following website: [email or web address]. By joining [facility]’s FollowMyHealth portal, you will also be able to view your health information using other applications (apps) compatible with our system.

## 2024-09-15 NOTE — PROGRESS NOTE ADULT - ASSESSMENT
17yo POD#1 s/p rLTCS.  Patient is stable and doing well post-operatively.    - Continue regular diet.  - Increase ambulation.  - Continue motrin, tylenol, oxycodone PRN for pain control.   - F/u AM VALENTE sousa MD

## 2024-09-15 NOTE — PROGRESS NOTE ADULT - SUBJECTIVE AND OBJECTIVE BOX
OB Progress Note:  Delivery, POD#1    S: 19yo POD#1 s/p rLTCS . Her pain is well controlled. She is tolerating a regular diet and passing flatus. Denies N/V. Denies CP/SOB/lightheadedness/dizziness.   She is ambulating without difficulty.   Voiding spontaneously.     O:   Vital Signs Last 24 Hrs  T(C): 36.9 (15 Sep 2024 06:09), Max: 36.9 (14 Sep 2024 11:02)  T(F): 98.4 (15 Sep 2024 06:09), Max: 98.4 (14 Sep 2024 11:02)  HR: 91 (15 Sep 2024 06:09) (76 - 113)  BP: 109/66 (15 Sep 2024 06:09) (93/70 - 127/71)  BP(mean): 98 (14 Sep 2024 14:30) (68 - 125)  RR: 16 (15 Sep 2024 06:09) (12 - 26)  SpO2: 98% (15 Sep 2024 06:09) (96% - 100%)    Parameters below as of 15 Sep 2024 06:09  Patient On (Oxygen Delivery Method): room air        Labs:  Blood type: O Positive  Rubella IgG: RPR: Negative                          11.5   12.01<H> >-----------< 162    ( 09-15 @ 05:33 )             35.4                        12.4   11.13<H> >-----------< 206    (  @ 08:00 )             37.3                  PE:  General: NAD  Abdomen: Mildly distended, appropriately tender, incision c/d/i.  Extremities: No erythema, no pitting edema

## 2024-09-15 NOTE — DISCHARGE NOTE OB - CARE PLAN
Principal Discharge DX:	Delivery of pregnancy by  section  Assessment and plan of treatment:	stable, routine postoperative care   1 Principal Discharge DX:	Delivery of pregnancy by  section  Assessment and plan of treatment:	After discharge, please stay on pelvic rest for 6 weeks, meaning no sexual intercourse, no tampons and no douching.  No driving for 2 weeks as women can loose a lot of blood during delivery and there is a possibility of being lightheaded/fainting.  No lifting objects heavier than baby for two weeks.  Expect to have vaginal bleeding/spotting for up to six weeks.  The bleeding should get lighter and more white/light brown with time.  For bleeding soaking more than a pad an hour or passing clots greater than the size of your fist, come in to the emergency department.    Follow up in OB office in 1-2 weeks for incision check.  Call for noticeable increase in redness or swelling at incision, discharge from incision, or opening of skin at incision site

## 2024-09-15 NOTE — DISCHARGE NOTE OB - CARE PROVIDER_API CALL
Yelena Gerardo  Obstetrics and Gynecology  [address] [facility] [address] [facility] [address]  [place] [address]  Phone: [phone]  Fax: [phone]  Follow Up Time:

## 2024-09-15 NOTE — DISCHARGE NOTE OB - PLAN OF CARE
stable, routine postoperative care After discharge, please stay on pelvic rest for 6 weeks, meaning no sexual intercourse, no tampons and no douching.  No driving for 2 weeks as women can loose a lot of blood during delivery and there is a possibility of being lightheaded/fainting.  No lifting objects heavier than baby for two weeks.  Expect to have vaginal bleeding/spotting for up to six weeks.  The bleeding should get lighter and more white/light brown with time.  For bleeding soaking more than a pad an hour or passing clots greater than the size of your fist, come in to the emergency department.    Follow up in OB office in 1-2 weeks for incision check.  Call for noticeable increase in redness or swelling at incision, discharge from incision, or opening of skin at incision site

## 2024-09-15 NOTE — DISCHARGE NOTE OB - NS MD DC FALL RISK RISK
For information on Fall & Injury Prevention, visit: [email or web address] OR  [email or web address] OR  https://www.cdc.gov/steadi/patient.html

## 2024-09-15 NOTE — DISCHARGE NOTE OB - REST! DO NOT DO HEAVY HOUSEWORK, LIFTING OR STRENOUS EXERCISE FOR TWO WEEKS
Patient is a 53y old  Female who presents with a chief complaint of SOB (24 Jun 2022 15:57)    HPI:  52 y/o F with no sig pmhx admitted on 6/24 for evaluation of malaise, fever and vomiting, headache, sore throat, eventually started to have shortness of breath and cough; upon admission found to be hypoxic to 90% on RA. Was vaccinated times 2 for COVID-19.         PMH: as above  PSH: as above  Meds: per reconciliation sheet, noted below  MEDICATIONS  (STANDING):  dexAMETHasone  Injectable 6 milliGRAM(s) IV Push daily  dextrose 5% + sodium chloride 0.9%. 1000 milliLiter(s) (90 mL/Hr) IV Continuous <Continuous>  enoxaparin Injectable 40 milliGRAM(s) SubCutaneous every 24 hours  remdesivir  IVPB   IV Intermittent   remdesivir  IVPB 100 milliGRAM(s) IV Intermittent every 24 hours    MEDICATIONS  (PRN):  acetaminophen     Tablet .. 650 milliGRAM(s) Oral every 6 hours PRN Temp greater or equal to 38C (100.4F), Mild Pain (1 - 3)  aluminum hydroxide/magnesium hydroxide/simethicone Suspension 30 milliLiter(s) Oral every 4 hours PRN Dyspepsia  benzocaine 15 mG/menthol 3.6 mG Lozenge 1 Lozenge Oral four times a day PRN Sore Throat  benzonatate 100 milliGRAM(s) Oral three times a day PRN Cough  guaifenesin/dextromethorphan Oral Liquid 10 milliLiter(s) Oral every 4 hours PRN Cough  ibuprofen  Tablet. 400 milliGRAM(s) Oral every 6 hours PRN Temp greater or equal to 38C (100.4F), Moderate Pain (4 - 6)  melatonin 3 milliGRAM(s) Oral at bedtime PRN Insomnia  ondansetron Injectable 4 milliGRAM(s) IV Push every 8 hours PRN Nausea and/or Vomiting    Allergies    No Known Allergies    Intolerances      Social: no smoking, no alcohol, no illegal drugs; no recent travel, no exposure to TB  FAMILY HISTORY:     no history of premature cardiovascular disease in first degree relatives  ROS: the patient had no chills, no HA, no dizziness, no sore throat, no blurry vision, no CP, no palpitations,  no abdominal pain, no diarrhea, no N/V, no dysuria, no leg pain, no claudication, no rash, no joint aches, no rectal pain or bleeding, no night sweats  All other systems reviewed and are negative    Vital Signs Last 24 Hrs  T(C): 38.5 (25 Jun 2022 09:59), Max: 39.1 (25 Jun 2022 08:56)  T(F): 101.3 (25 Jun 2022 09:59), Max: 102.3 (25 Jun 2022 08:56)  HR: 89 (25 Jun 2022 08:35) (75 - 89)  BP: 120/66 (25 Jun 2022 08:35) (117/76 - 152/85)  BP(mean): 87 (24 Jun 2022 14:10) (87 - 87)  RR: 17 (25 Jun 2022 08:35) (16 - 20)  SpO2: 99% (25 Jun 2022 08:35) (96% - 100%)  Daily     Daily     PE:    Constitutional: frail looking  HEENT: NC/AT, EOMI, PERRLA, conjunctivae clear; ears and nose atraumatic; pharynx clear  Neck: supple; thyroid not palpable  Back: no tenderness  Respiratory: respiratory effort normal; distant breath sounds  Cardiovascular: S1S2 regular, no murmurs  Abdomen: soft, not tender, not distended, positive BS; no liver or spleen organomegaly  Genitourinary: no suprapubic tenderness  Musculoskeletal: no muscle tenderness, no joint swelling or tenderness  Neurological/ Psychiatric: AxOx3, judgement and insight normal;  moving all extremities  Skin: no rashes; no palpable lesions    Labs: all available labs reviewed                        13.1   8.82  )-----------( 176      ( 25 Jun 2022 08:22 )             38.6     06-25    134<L>  |  102  |  14  ----------------------------<  112<H>  4.0   |  24  |  0.57    Ca    9.1      25 Jun 2022 08:22    TPro  6.2  /  Alb  2.8<L>  /  TBili  0.4  /  DBili  0.1  /  AST  8<L>  /  ALT  18  /  AlkPhos  51  06-25     LIVER FUNCTIONS - ( 25 Jun 2022 08:22 )  Alb: 2.8 g/dL / Pro: 6.2 gm/dL / ALK PHOS: 51 U/L / ALT: 18 U/L / AST: 8 U/L / GGT: x           Flu With COVID-19 By MELISA (06.24.22 @ 08:52)    SARS-CoV-2 Result: Detected: This Respiratory Panel uses polymerase chain reaction (PCR) to detect for  influenza A; influenza B; respiratory syncytial virus; and SARS-CoV-2.  This test was validated by [employer] and is in use under the FDA  Emergency Use Authorization (EUA) for clinical labs CLIA-certified to  perform high complexity testing. Test results should be correlated with  clinical presentation, patient history, and epidemiology.    Influenza A Result: NotDetec    Influenza B Result: NotDetec    Resp Syn Virus Result: NotDetec      < from: CT Angio Chest PE Protocol w/ IV Cont (06.24.22 @ 13:51) >    ACC: [id number] EXAM:  CT ANGIO CHEST PULM [facility]                          PROCEDURE DATE:  06/24/2022          INTERPRETATION:  Clinical information: Shortness of breath, Covid   positive, elevated d-dimer    TECHNIQUE: A volumetric acquisition of the chest was obtained from the   thoracic inlet to the upper abdomen during dynamic administration of   intravenous contrast. 3D MIP images were provided.    CONTRAST/COMPLICATIONS:  IV Contrast: Omnipaque 350  90 cc administered   10 cc discarded  Oral Contrast: NONE  Complications: None reported at time of study completion    COMPARISON: No prior chest CT available for comparison    FINDINGS:    CTA: The study is technically adequate with a good contrast bolus to the   pulmonary arteries. There are no filling defects in the pulmonary artery   or its branches. The cardiac chambers are normal in size. There is no   pericardial effusion. The aorta is normal in caliber.    Lungs/Airways/Pleura: There are scattered ill-defined groundglass   densities in the right lung. There is mucous plugging in the right lower   lobe. There are trace pleural effusions    Mediastinum/Lymph nodes: No thoracic adenopathy.    Upper Abdomen: Unremarkable.    Bones and Soft Tissues: No aggressive osseous lesions.    IMPRESSION:    1.  No pulmonary thromboembolism    2.  Ill-defined groundglass densities in the right lung are suspected to   be infectious    3.  Trace pleural effusions    < end of copied text >            Radiology: all available radiological tests reviewed    Advanced directives addressed: full resuscitation Statement Selected

## 2024-09-15 NOTE — DISCHARGE NOTE OB - HOSPITAL COURSE
The pt underwent scheduled  delivery with delivery of female  weight 6 lbs 12 oz Apgars 9,9. uncomplicated post operative course

## 2024-09-15 NOTE — DISCHARGE NOTE OB - MATERIALS PROVIDED
Vaccinations/[facility]  Screening Program/Breastfeeding Log/Bottle Feeding Log/Breastfeeding Mother’s Support Group Information/Guide to Postpartum Care/[facility] Hearing Screen Program/Back To Sleep Handout/Shaken Baby Prevention Handout/Breastfeeding Guide and Packet/Birth Certificate Instructions/Tdap Vaccination (VIS Pub Date: 2012)

## 2024-09-16 RX ORDER — FERROUS SULFATE 325(65) MG
325 TABLET ORAL DAILY
Refills: 0 | Status: DISCONTINUED | OUTPATIENT
Start: 2024-09-16 | End: 2024-09-17

## 2024-09-16 RX ORDER — VITAMIN A, ASCORBIC ACID, VITAMIN D, .ALPHA.-TOCOPHEROL, THIAMINE MONONITRATE, RIBOFLAVIN, NIACIN, PYRIDOXINE HYDROCHLORIDE, FOLIC ACID, CYANOCOBALAMIN, CALCIUM, IRON, MAGNESIUM, ZINC, AND COPPER 2700; 70; 400; 30; 1.6; 1.8; 18; 2.5; 1; 12; 100; 65; 25; 25; 2 [IU]/1; MG/1; [IU]/1; [IU]/1; MG/1; MG/1; MG/1; MG/1; MG/1; UG/1; MG/1; MG/1; MG/1; MG/1; MG/1
1 TABLET ORAL DAILY
Refills: 0 | Status: DISCONTINUED | OUTPATIENT
Start: 2024-09-16 | End: 2024-09-17

## 2024-09-16 RX ORDER — OXYCODONE HYDROCHLORIDE 5 MG/1
5 TABLET ORAL
Refills: 0 | Status: DISCONTINUED | OUTPATIENT
Start: 2024-09-16 | End: 2024-09-17

## 2024-09-16 RX ADMIN — ACETAMINOPHEN 975 MILLIGRAM(S): 325 TABLET ORAL at 00:00

## 2024-09-16 RX ADMIN — ACETAMINOPHEN 975 MILLIGRAM(S): 325 TABLET ORAL at 23:04

## 2024-09-16 RX ADMIN — ACETAMINOPHEN 975 MILLIGRAM(S): 325 TABLET ORAL at 06:02

## 2024-09-16 RX ADMIN — ACETAMINOPHEN 975 MILLIGRAM(S): 325 TABLET ORAL at 18:56

## 2024-09-16 RX ADMIN — OXYCODONE HYDROCHLORIDE 5 MILLIGRAM(S): 5 TABLET ORAL at 03:00

## 2024-09-16 RX ADMIN — ACETAMINOPHEN 975 MILLIGRAM(S): 325 TABLET ORAL at 06:45

## 2024-09-16 RX ADMIN — Medication 30 MILLILITER(S): at 20:21

## 2024-09-16 RX ADMIN — ACETAMINOPHEN 975 MILLIGRAM(S): 325 TABLET ORAL at 18:30

## 2024-09-16 RX ADMIN — Medication 5000 UNIT(S): at 06:02

## 2024-09-16 RX ADMIN — ACETAMINOPHEN 975 MILLIGRAM(S): 325 TABLET ORAL at 12:09

## 2024-09-16 RX ADMIN — Medication 80 MILLIGRAM(S): at 12:10

## 2024-09-16 RX ADMIN — ACETAMINOPHEN 975 MILLIGRAM(S): 325 TABLET ORAL at 12:39

## 2024-09-16 RX ADMIN — Medication 5000 UNIT(S): at 18:30

## 2024-09-16 RX ADMIN — OXYCODONE HYDROCHLORIDE 5 MILLIGRAM(S): 5 TABLET ORAL at 02:28

## 2024-09-16 RX ADMIN — ACETAMINOPHEN 975 MILLIGRAM(S): 325 TABLET ORAL at 00:46

## 2024-09-16 RX ADMIN — ACETAMINOPHEN 975 MILLIGRAM(S): 325 TABLET ORAL at 23:34

## 2024-09-16 NOTE — PROGRESS NOTE ADULT - ATTENDING COMMENTS
patient seen at bedside. patient refuses to be examined by me as I am a male provider. patient stated this is as per [employer] reasons. I told her that I am the only doctor on Call  for our group today and no female providers are available. patient still refused.   patient also stated that no OB provider saw her yesterday, and she wasn't seen by anyone else, but Dr. Garcia saw patient. Dr. Garcia wrote a note and NP Blaire Duvall specifically remembers Dr. Garcia going into patient's room yesterday. When I mentioned this to the patient her response was "well I saw so many people yesterday I don't really remember who I saw

## 2024-09-16 NOTE — PROGRESS NOTE ADULT - SUBJECTIVE AND OBJECTIVE BOX
SUBJECTIVE:    Pain: Controlled    Complaints: None    MILESTONES:    Alert and Oriented x 3  [ x ]  Out of bed/ ambulating. [ x ]  Flatus:   Positive [ X ]  Negative [  ]  Bowel movement  [  ] Positive [  ] Negative   Voiding [x  ] Due to void [  ]   [place]/Indwelling catheter in place [  ]  Diet: Regular [ x ]  Clears [  ]  NPO [  ]    Infant feeding:  Breast [  ]   Bottle [  ]  Both [ X ]  Feeding related issues and/or concerns:      OBJECTIVE:  T(C): 36.8 (24 @ 06:19), Max: 36.9 (09-15-24 @ 09:14)  HR: 90 (24 @ 06:19) (78 - 90)  BP: 119/72 (24 @ 06:19) (107/63 - 120/68)  RR: 18 (24 @ 06:19) (18 - 18)  SpO2: 100% (24 @ 06:19) (100% - 100%)  Wt(kg): --                        11.5   12.01 )-----------( 162      ( 15 Sep 2024 05:33 )             35.4           Blood Type: O Positive    RPR: Negative          MEDICATIONS  (STANDING):  acetaminophen     Tablet .. 975 milliGRAM(s) Oral every 6 hours  heparin   Injectable 5000 Unit(s) SubCutaneous every 12 hours  oxytocin Infusion 167 milliUNIT(s)/Min (167 mL/Hr) IV Continuous <Continuous>    MEDICATIONS  (PRN):  diphenhydrAMINE 25 milliGRAM(s) Oral every 6 hours PRN Pruritus  lanolin Ointment 1 Application(s) Topical every 6 hours PRN Sore Nipples  magnesium hydroxide Suspension 30 milliLiter(s) Oral two times a day PRN Constipation  oxyCODONE    IR 5 milliGRAM(s) Oral once PRN Moderate to Severe Pain (4-10)  oxyCODONE    IR 5 milliGRAM(s) Oral every 3 hours PRN Moderate to Severe Pain (4-10)  simethicone 80 milliGRAM(s) Chew every 4 hours PRN Gas        ASSESSMENT:    18y     G  2   P  2004       PO Day#  2        Delivery: Primary [  ]    Repeat [ X ]       QBL - 596                                  Indication of procedure: Scheduled    Condition: Stable    Past Medical History significant for: HPI:  18y  at 39.2 weeks GA presents to L&D for scheduled repeat . Patient's first  was d/t genital herpes outbreak. Patient denies an outbreak during this pregnancy, and is currently on Valtrex 500mg BID. Patient denies vaginal bleeding, contractions and leakage of fluid. She endorses good fetal movement. Denies fevers, chills, nausea and vomiting. No other complaints at this time. Prenatal course uncomplicated.    MELANIE: 2024    POB: pLTCS  d/t genital herpes outbreak - FT 6#13  PGYN: h/o genital herpes on Valtrex; last outbreak in previous pregnancy ; denies fibroids, ovarian cysts, STD hx, or abnormal PAPs   PMH: bilateral sciatica pain  PSH: pLTCS   SH: Denies EtOH, tobacco and illicit drug use during this pregnancy; feels safe at home   Psych Hx: denies hx of anxiety or depression  Meds: PNVs, Valtrex 500mg BID  Allergies: Motrin (rash)    EFW: 3168 (extrapolated from  sono)    GBS: negative           (14 Sep 2024 08:17)      Current Issues:    Breasts:  Soft [x  ]   Engorged [  ]  Nipples:  Abdomen: Soft [ x ]   Distended [  ] Nontender [  ]     Bowel sounds :  Present [  ]  Absent [  ]   Fundus:  Firm [x  ]  Boggy [  ]    Abdominal incision: Clean, Dry and Intact [x  ]  Staples [  ] Steri Strips [  ] Dermabond [  ] Sutures [  ]   Dressing Removed, Incision CDI with Steri Strips.    Patient wearing abdominal binder for support.    Vaginal: Lochia:  Heavy [  ]  Moderate [ x ]   Scant [  ]    Extremities: Edema [ X ] Negative Kathryn's Sign [ X ] Nontender Antonio  [ x ] Positive pedal pulses [  ]    Other relevant physical exam findings:      PLAN:    Plan: Increase ambulation, analgesia PRN and pain medication protocol standing oxycodone, ibuprofen and acetaminophen.    Diet: Regular diet    Continue routine post-operative and postpartum care.     Discharge Planning [ X ]    For Discharge Today  [    ]    Consults:  Social Work [  ]  Lactation [ x ]  Other [         ]

## 2024-09-17 ENCOUNTER — NON-APPOINTMENT (OUTPATIENT)
Age: 19
End: 2024-09-17

## 2024-09-17 VITALS
OXYGEN SATURATION: 100 % | TEMPERATURE: 98 F | DIASTOLIC BLOOD PRESSURE: 74 MMHG | HEART RATE: 91 BPM | RESPIRATION RATE: 18 BRPM | SYSTOLIC BLOOD PRESSURE: 115 MMHG

## 2024-09-17 RX ORDER — ACETAMINOPHEN 325 MG/1
2 TABLET ORAL
Qty: 0 | Refills: 0 | DISCHARGE

## 2024-09-17 RX ADMIN — ACETAMINOPHEN 975 MILLIGRAM(S): 325 TABLET ORAL at 12:30

## 2024-09-17 RX ADMIN — VITAMIN A, ASCORBIC ACID, VITAMIN D, .ALPHA.-TOCOPHEROL, THIAMINE MONONITRATE, RIBOFLAVIN, NIACIN, PYRIDOXINE HYDROCHLORIDE, FOLIC ACID, CYANOCOBALAMIN, CALCIUM, IRON, MAGNESIUM, ZINC, AND COPPER 1 TABLET(S): 2700; 70; 400; 30; 1.6; 1.8; 18; 2.5; 1; 12; 100; 65; 25; 25; 2 TABLET ORAL at 11:58

## 2024-09-17 RX ADMIN — ACETAMINOPHEN 975 MILLIGRAM(S): 325 TABLET ORAL at 11:59

## 2024-09-17 RX ADMIN — ACETAMINOPHEN 975 MILLIGRAM(S): 325 TABLET ORAL at 05:49

## 2024-09-17 RX ADMIN — Medication 5000 UNIT(S): at 05:19

## 2024-09-17 RX ADMIN — ACETAMINOPHEN 975 MILLIGRAM(S): 325 TABLET ORAL at 05:19

## 2024-09-17 RX ADMIN — Medication 325 MILLIGRAM(S): at 11:59

## 2024-09-17 NOTE — PROGRESS NOTE ADULT - SUBJECTIVE AND OBJECTIVE BOX
OB Postpartum Note: Repeat  Delivery, POD#3    S: 19yo now  POD#3 s/p rLTCS.  mL. Pt seen and examine at bedside no acute events overnight The patient feels well.  Pain is well controlled. She is tolerating a regular diet and passing flatus. She is voiding spontaneously, and ambulating without difficulty. Denies CP/SOB. Denies lightheadedness/dizziness. Denies N/V.    O:  Vitals:  Vital Signs Last 24 Hrs  T(C): 36.8 (17 Sep 2024 05:30), Max: 36.8 (17 Sep 2024 05:30)  T(F): 98.2 (17 Sep 2024 05:30), Max: 98.2 (17 Sep 2024 05:30)  HR: 81 (17 Sep 2024 05:30) (73 - 96)  BP: 108/70 (17 Sep 2024 05:30) (108/70 - 128/72)  BP(mean): --  RR: 18 (17 Sep 2024 05:30) (18 - 18)  SpO2: 99% (17 Sep 2024 05:30) (98% - 99%)    Parameters below as of 17 Sep 2024 05:30  Patient On (Oxygen Delivery Method): room air        MEDICATIONS  (STANDING):  acetaminophen     Tablet .. 975 milliGRAM(s) Oral every 6 hours  ferrous    sulfate 325 milliGRAM(s) Oral daily  heparin   Injectable 5000 Unit(s) SubCutaneous every 12 hours  prenatal multivitamin 1 Tablet(s) Oral daily    MEDICATIONS  (PRN):  diphenhydrAMINE 25 milliGRAM(s) Oral every 6 hours PRN Pruritus  lanolin Ointment 1 Application(s) Topical every 6 hours PRN Sore Nipples  magnesium hydroxide Suspension 30 milliLiter(s) Oral two times a day PRN Constipation  oxyCODONE    IR 5 milliGRAM(s) Oral every 3 hours PRN Moderate to Severe Pain (4-10)  oxyCODONE    IR 5 milliGRAM(s) Oral once PRN Moderate to Severe Pain (4-10)  simethicone 80 milliGRAM(s) Chew every 4 hours PRN Gas      LABS:  Blood type: O Positive  Rubella IgG: RPR: Negative                          11.5   12.01[H] >-----------< 162    ( 09-15 @ 05:33 )             35.4      Physical exam:  Gen: NAD  Abdomen: Soft, nontender, no distension , firm uterine fundus at umbilicus.  Incision: Clean, dry, and intact, steri strips intact   Pelvic: Normal lochia noted  Ext: No calf tenderness, no erythema no edema, pedal pulse 2+    A/P: 19yo now  POD#3 s/p rLTCS.  mL  Patient is stable and is doing well post-operatively.    - Continue routine Postop/ PP care  - Continue motrin, tylenol, oxycodone PRN for pain control.  - Increase ambulation  - Continue regular diet  - Incision care reviewed  - Discharge planning  - f/u in MD office 6 weeks/prn    Yulissa Cleveland NP     OB Postpartum Note: Repeat  Delivery, POD#3    S: 19yo now  POD#3 s/p rLTCS.  mL. Pt seen and examine at bedside no acute events overnight The patient feels well.  Pain is well controlled. She is tolerating a regular diet and passing flatus. She is voiding spontaneously, and ambulating without difficulty. Denies CP/SOB. Denies lightheadedness/dizziness. Denies N/V.    O:  Vitals:  Vital Signs Last 24 Hrs  T(C): 36.8 (17 Sep 2024 05:30), Max: 36.8 (17 Sep 2024 05:30)  T(F): 98.2 (17 Sep 2024 05:30), Max: 98.2 (17 Sep 2024 05:30)  HR: 81 (17 Sep 2024 05:30) (73 - 96)  BP: 108/70 (17 Sep 2024 05:30) (108/70 - 128/72)  BP(mean): --  RR: 18 (17 Sep 2024 05:30) (18 - 18)  SpO2: 99% (17 Sep 2024 05:30) (98% - 99%)    Parameters below as of 17 Sep 2024 05:30  Patient On (Oxygen Delivery Method): room air        MEDICATIONS  (STANDING):  acetaminophen     Tablet .. 975 milliGRAM(s) Oral every 6 hours  ferrous    sulfate 325 milliGRAM(s) Oral daily  heparin   Injectable 5000 Unit(s) SubCutaneous every 12 hours  prenatal multivitamin 1 Tablet(s) Oral daily    MEDICATIONS  (PRN):  diphenhydrAMINE 25 milliGRAM(s) Oral every 6 hours PRN Pruritus  lanolin Ointment 1 Application(s) Topical every 6 hours PRN Sore Nipples  magnesium hydroxide Suspension 30 milliLiter(s) Oral two times a day PRN Constipation  oxyCODONE    IR 5 milliGRAM(s) Oral every 3 hours PRN Moderate to Severe Pain (4-10)  oxyCODONE    IR 5 milliGRAM(s) Oral once PRN Moderate to Severe Pain (4-10)  simethicone 80 milliGRAM(s) Chew every 4 hours PRN Gas      LABS:  Blood type: O Positive  Rubella IgG: RPR: Negative                          11.5   12.01[H] >-----------< 162    ( 09-15 @ 05:33 )             35.4      Physical exam:  Gen: NAD  Abdomen: Soft, nontender, no distension , firm uterine fundus at umbilicus.  Incision: Clean, dry, and intact, steri strips intact   Pelvic: Normal lochia noted  Ext: No calf tenderness, no erythema no edema, pedal pulse 2+    A/P: 19yo now  POD#3 s/p rLTCS.  mL  Patient is stable and is doing well post-operatively.    - Continue routine Postop/ PP care  - Continue motrin, tylenol, oxycodone PRN for pain control.  - Increase ambulation  - Continue regular diet  - Incision care reviewed  - Discharge planning today  - f/u in MD office 6 weeks/prn    Yulissa Cleveland NP

## 2024-09-27 NOTE — OB PROVIDER H&P - NSPPHRISKSTATUS_OBGYN_ALL_OB
Render Post-Care Instructions In Note?: no Duration Of Freeze Thaw-Cycle (Seconds): 10 Detail Level: Simple Consent: The patient's consent was obtained including but not limited to risks of crusting, scabbing, blistering, scarring, darker or lighter pigmentary change, recurrence, incomplete removal and infection. Post-Care Instructions: I reviewed with the patient in detail post-care instructions. Patient is to wear sunprotection, and avoid picking at any of the treated lesions. Pt may apply Vaseline to crusted or scabbing areas. Total Number Of Aks Treated: 8 Number Of Freeze-Thaw Cycles: 1 freeze-thaw cycle Moderate Risk

## 2024-11-12 ENCOUNTER — APPOINTMENT (OUTPATIENT)
Dept: OBGYN | Facility: CLINIC | Age: 19
End: 2024-11-12
Payer: MEDICAID

## 2024-11-12 VITALS
SYSTOLIC BLOOD PRESSURE: 112 MMHG | DIASTOLIC BLOOD PRESSURE: 73 MMHG | HEIGHT: 62 IN | WEIGHT: 180 LBS | HEART RATE: 83 BPM | BODY MASS INDEX: 33.13 KG/M2

## 2024-11-12 DIAGNOSIS — Z72.51 HIGH RISK HETEROSEXUAL BEHAVIOR: ICD-10-CM

## 2024-11-12 PROCEDURE — 81025 URINE PREGNANCY TEST: CPT

## 2024-11-12 PROCEDURE — 99213 OFFICE O/P EST LOW 20 MIN: CPT | Mod: TH

## 2024-11-18 LAB
HCG SERPL-MCNC: <1 MIU/ML
HCG UR QL: NEGATIVE
QUALITY CONTROL: YES

## 2025-01-08 DIAGNOSIS — Z72.51 HIGH RISK HETEROSEXUAL BEHAVIOR: ICD-10-CM

## 2025-01-28 ENCOUNTER — APPOINTMENT (OUTPATIENT)
Age: 20
End: 2025-01-28
Payer: MEDICAID

## 2025-01-28 VITALS
HEART RATE: 97 BPM | WEIGHT: 181 LBS | DIASTOLIC BLOOD PRESSURE: 80 MMHG | SYSTOLIC BLOOD PRESSURE: 126 MMHG | HEIGHT: 62 IN | BODY MASS INDEX: 33.31 KG/M2

## 2025-01-28 PROCEDURE — 76830 TRANSVAGINAL US NON-OB: CPT

## 2025-01-28 PROCEDURE — 99214 OFFICE O/P EST MOD 30 MIN: CPT | Mod: 25

## 2025-01-28 PROCEDURE — 58300 INSERT INTRAUTERINE DEVICE: CPT

## 2025-01-29 LAB
HCG UR QL: NEGATIVE
QUALITY CONTROL: YES

## 2025-02-17 PROBLEM — Z30.430 ENCOUNTER FOR IUD INSERTION: Status: ACTIVE | Noted: 2025-02-17 | Resolved: 2025-03-03

## 2025-02-17 PROBLEM — N92.6 IRREGULAR UTERINE BLEEDING: Status: ACTIVE | Noted: 2025-02-17

## 2025-02-17 PROBLEM — Z30.09 GENERAL COUNSELLING AND ADVICE ON CONTRACEPTION: Status: ACTIVE | Noted: 2025-02-17

## 2025-02-24 ENCOUNTER — APPOINTMENT (OUTPATIENT)
Dept: OBGYN | Facility: CLINIC | Age: 20
End: 2025-02-24

## 2025-05-04 ENCOUNTER — NON-APPOINTMENT (OUTPATIENT)
Age: 20
End: 2025-05-04

## 2025-05-04 PROBLEM — N93.9 EPISODE OF HEAVY VAGINAL BLEEDING: Status: ACTIVE | Noted: 2025-05-04 | Resolved: 2025-06-03

## 2025-05-04 PROBLEM — Z30.431 IUD CHECK UP: Status: ACTIVE | Noted: 2025-05-04

## 2025-05-07 ENCOUNTER — APPOINTMENT (OUTPATIENT)
Dept: OBGYN | Facility: CLINIC | Age: 20
End: 2025-05-07
Payer: MEDICAID

## 2025-05-07 VITALS
BODY MASS INDEX: 33.31 KG/M2 | SYSTOLIC BLOOD PRESSURE: 116 MMHG | HEART RATE: 91 BPM | WEIGHT: 181 LBS | HEIGHT: 62 IN | DIASTOLIC BLOOD PRESSURE: 74 MMHG

## 2025-05-07 PROCEDURE — 76830 TRANSVAGINAL US NON-OB: CPT

## 2025-05-07 PROCEDURE — 99212 OFFICE O/P EST SF 10 MIN: CPT

## 2025-05-21 ENCOUNTER — APPOINTMENT (OUTPATIENT)
Dept: OBGYN | Facility: CLINIC | Age: 20
End: 2025-05-21

## 2025-05-31 ENCOUNTER — APPOINTMENT (OUTPATIENT)
Dept: OBGYN | Facility: CLINIC | Age: 20
End: 2025-05-31
Payer: MEDICAID

## 2025-05-31 VITALS
DIASTOLIC BLOOD PRESSURE: 79 MMHG | BODY MASS INDEX: 33.13 KG/M2 | HEIGHT: 62 IN | SYSTOLIC BLOOD PRESSURE: 113 MMHG | WEIGHT: 180 LBS | HEART RATE: 90 BPM

## 2025-05-31 DIAGNOSIS — Z30.431 ENCOUNTER FOR ROUTINE CHECKING OF INTRAUTERINE CONTRACEPTIVE DEVICE: ICD-10-CM

## 2025-05-31 DIAGNOSIS — Z01.419 ENCOUNTER FOR GYNECOLOGICAL EXAMINATION (GENERAL) (ROUTINE) W/OUT ABNORMAL FINDINGS: ICD-10-CM

## 2025-05-31 PROCEDURE — 99214 OFFICE O/P EST MOD 30 MIN: CPT | Mod: 25

## 2025-05-31 PROCEDURE — 99395 PREV VISIT EST AGE 18-39: CPT

## 2025-05-31 RX ORDER — IBUPROFEN 800 MG
TABLET ORAL
Refills: 0 | Status: ACTIVE | COMMUNITY

## 2025-06-04 LAB — CYTOLOGY CVX/VAG DOC THIN PREP: NORMAL
